# Patient Record
Sex: FEMALE | Race: WHITE | HISPANIC OR LATINO | Employment: UNEMPLOYED | URBAN - METROPOLITAN AREA
[De-identification: names, ages, dates, MRNs, and addresses within clinical notes are randomized per-mention and may not be internally consistent; named-entity substitution may affect disease eponyms.]

---

## 2020-06-02 ENCOUNTER — TELEPHONE (OUTPATIENT)
Dept: INTERNAL MEDICINE CLINIC | Facility: CLINIC | Age: 35
End: 2020-06-02

## 2020-06-03 ENCOUNTER — OFFICE VISIT (OUTPATIENT)
Dept: INTERNAL MEDICINE CLINIC | Facility: CLINIC | Age: 35
End: 2020-06-03

## 2020-06-03 ENCOUNTER — TELEPHONE (OUTPATIENT)
Dept: INTERNAL MEDICINE CLINIC | Facility: CLINIC | Age: 35
End: 2020-06-03

## 2020-06-03 VITALS
WEIGHT: 145.94 LBS | SYSTOLIC BLOOD PRESSURE: 210 MMHG | DIASTOLIC BLOOD PRESSURE: 118 MMHG | HEIGHT: 59 IN | TEMPERATURE: 98.2 F | HEART RATE: 86 BPM | BODY MASS INDEX: 29.42 KG/M2

## 2020-06-03 DIAGNOSIS — I16.0 HYPERTENSIVE URGENCY: Primary | ICD-10-CM

## 2020-06-03 DIAGNOSIS — E66.3 OVERWEIGHT WITH BODY MASS INDEX (BMI) OF 29 TO 29.9 IN ADULT: ICD-10-CM

## 2020-06-03 PROCEDURE — 99202 OFFICE O/P NEW SF 15 MIN: CPT | Performed by: PHYSICIAN ASSISTANT

## 2020-06-03 PROCEDURE — 1036F TOBACCO NON-USER: CPT | Performed by: PHYSICIAN ASSISTANT

## 2020-06-03 PROCEDURE — 3008F BODY MASS INDEX DOCD: CPT | Performed by: PHYSICIAN ASSISTANT

## 2020-06-03 RX ORDER — LOSARTAN POTASSIUM 25 MG/1
25 TABLET ORAL DAILY
COMMUNITY
End: 2020-06-03

## 2020-06-03 RX ORDER — AMLODIPINE BESYLATE 5 MG/1
5 TABLET ORAL DAILY
Qty: 30 TABLET | Refills: 5 | Status: SHIPPED | OUTPATIENT
Start: 2020-06-03 | End: 2020-06-19 | Stop reason: SDUPTHER

## 2020-06-03 RX ORDER — CHLORTHALIDONE 25 MG/1
25 TABLET ORAL DAILY
Qty: 30 TABLET | Refills: 5 | Status: SHIPPED | OUTPATIENT
Start: 2020-06-03

## 2020-06-08 ENCOUNTER — TELEPHONE (OUTPATIENT)
Dept: INTERNAL MEDICINE CLINIC | Facility: CLINIC | Age: 35
End: 2020-06-08

## 2020-06-10 ENCOUNTER — TELEPHONE (OUTPATIENT)
Dept: INTERNAL MEDICINE CLINIC | Facility: CLINIC | Age: 35
End: 2020-06-10

## 2020-06-19 ENCOUNTER — OFFICE VISIT (OUTPATIENT)
Dept: INTERNAL MEDICINE CLINIC | Facility: CLINIC | Age: 35
End: 2020-06-19

## 2020-06-19 VITALS
BODY MASS INDEX: 28.91 KG/M2 | HEIGHT: 60 IN | WEIGHT: 147.27 LBS | HEART RATE: 80 BPM | TEMPERATURE: 97.9 F | DIASTOLIC BLOOD PRESSURE: 88 MMHG | SYSTOLIC BLOOD PRESSURE: 142 MMHG

## 2020-06-19 DIAGNOSIS — I10 ESSENTIAL HYPERTENSION: ICD-10-CM

## 2020-06-19 PROCEDURE — 3079F DIAST BP 80-89 MM HG: CPT | Performed by: PHYSICIAN ASSISTANT

## 2020-06-19 PROCEDURE — 3077F SYST BP >= 140 MM HG: CPT | Performed by: PHYSICIAN ASSISTANT

## 2020-06-19 PROCEDURE — 99213 OFFICE O/P EST LOW 20 MIN: CPT | Performed by: PHYSICIAN ASSISTANT

## 2020-06-19 PROCEDURE — 3008F BODY MASS INDEX DOCD: CPT | Performed by: PHYSICIAN ASSISTANT

## 2020-06-19 PROCEDURE — 1036F TOBACCO NON-USER: CPT | Performed by: PHYSICIAN ASSISTANT

## 2020-06-19 RX ORDER — AMLODIPINE BESYLATE 10 MG/1
10 TABLET ORAL DAILY
Qty: 30 TABLET | Refills: 5 | Status: SHIPPED | OUTPATIENT
Start: 2020-06-19

## 2020-09-02 ENCOUNTER — APPOINTMENT (OUTPATIENT)
Dept: LAB | Facility: HOSPITAL | Age: 35
End: 2020-09-02

## 2020-09-02 ENCOUNTER — OFFICE VISIT (OUTPATIENT)
Dept: INTERNAL MEDICINE CLINIC | Facility: CLINIC | Age: 35
End: 2020-09-02

## 2020-09-02 VITALS
TEMPERATURE: 97.5 F | HEIGHT: 59 IN | BODY MASS INDEX: 30.58 KG/M2 | OXYGEN SATURATION: 100 % | DIASTOLIC BLOOD PRESSURE: 82 MMHG | HEART RATE: 82 BPM | SYSTOLIC BLOOD PRESSURE: 138 MMHG | WEIGHT: 151.68 LBS

## 2020-09-02 DIAGNOSIS — R20.0 NUMBNESS OF FINGERS: ICD-10-CM

## 2020-09-02 DIAGNOSIS — I10 ESSENTIAL HYPERTENSION: Primary | ICD-10-CM

## 2020-09-02 DIAGNOSIS — M54.2 NECK PAIN: ICD-10-CM

## 2020-09-02 DIAGNOSIS — M79.641 RIGHT HAND PAIN: ICD-10-CM

## 2020-09-02 DIAGNOSIS — I16.0 HYPERTENSIVE URGENCY: ICD-10-CM

## 2020-09-02 DIAGNOSIS — M79.661 RIGHT CALF PAIN: ICD-10-CM

## 2020-09-02 DIAGNOSIS — E66.3 OVERWEIGHT WITH BODY MASS INDEX (BMI) OF 29 TO 29.9 IN ADULT: ICD-10-CM

## 2020-09-02 LAB
ALBUMIN SERPL BCP-MCNC: 3.9 G/DL (ref 3.5–5)
ALP SERPL-CCNC: 79 U/L (ref 46–116)
ALT SERPL W P-5'-P-CCNC: 32 U/L (ref 12–78)
ANION GAP SERPL CALCULATED.3IONS-SCNC: 8 MMOL/L (ref 4–13)
AST SERPL W P-5'-P-CCNC: 20 U/L (ref 5–45)
BILIRUB SERPL-MCNC: 0.3 MG/DL (ref 0.2–1)
BUN SERPL-MCNC: 9 MG/DL (ref 5–25)
CALCIUM SERPL-MCNC: 7.8 MG/DL (ref 8.3–10.1)
CHLORIDE SERPL-SCNC: 104 MMOL/L (ref 100–108)
CHOLEST SERPL-MCNC: 148 MG/DL (ref 50–200)
CO2 SERPL-SCNC: 27 MMOL/L (ref 21–32)
CREAT SERPL-MCNC: 0.6 MG/DL (ref 0.6–1.3)
GFR SERPL CREATININE-BSD FRML MDRD: 118 ML/MIN/1.73SQ M
GLUCOSE SERPL-MCNC: 99 MG/DL (ref 65–140)
HDLC SERPL-MCNC: 34 MG/DL
LDLC SERPL CALC-MCNC: 95 MG/DL (ref 0–100)
NONHDLC SERPL-MCNC: 114 MG/DL
POTASSIUM SERPL-SCNC: 3.4 MMOL/L (ref 3.5–5.3)
PROT SERPL-MCNC: 8.1 G/DL (ref 6.4–8.2)
SODIUM SERPL-SCNC: 139 MMOL/L (ref 136–145)
TRIGL SERPL-MCNC: 93 MG/DL

## 2020-09-02 PROCEDURE — 80061 LIPID PANEL: CPT

## 2020-09-02 PROCEDURE — 36415 COLL VENOUS BLD VENIPUNCTURE: CPT

## 2020-09-02 PROCEDURE — 80053 COMPREHEN METABOLIC PANEL: CPT

## 2020-09-02 PROCEDURE — 99214 OFFICE O/P EST MOD 30 MIN: CPT | Performed by: PHYSICIAN ASSISTANT

## 2020-09-02 RX ORDER — MELOXICAM 15 MG/1
15 TABLET ORAL DAILY
Qty: 10 TABLET | Refills: 0 | Status: SHIPPED | OUTPATIENT
Start: 2020-09-02 | End: 2020-09-12

## 2020-09-02 RX ORDER — CYCLOBENZAPRINE HCL 10 MG
10 TABLET ORAL
Qty: 10 TABLET | Refills: 0 | Status: SHIPPED | OUTPATIENT
Start: 2020-09-02 | End: 2020-09-12

## 2020-09-02 NOTE — PROGRESS NOTES
Assessment/Plan:      Diagnoses and all orders for this visit:    Essential hypertension    Right calf pain  -     meloxicam (MOBIC) 15 mg tablet; Take 1 tablet (15 mg total) by mouth daily for 10 days Con comida  -     cyclobenzaprine (FLEXERIL) 10 mg tablet; Take 1 tablet (10 mg total) by mouth daily at bedtime for 10 days    Right hand pain  -     meloxicam (MOBIC) 15 mg tablet; Take 1 tablet (15 mg total) by mouth daily for 10 days Con comida  -     cyclobenzaprine (FLEXERIL) 10 mg tablet; Take 1 tablet (10 mg total) by mouth daily at bedtime for 10 days    Numbness of fingers  -     meloxicam (MOBIC) 15 mg tablet; Take 1 tablet (15 mg total) by mouth daily for 10 days Con comida  -     cyclobenzaprine (FLEXERIL) 10 mg tablet; Take 1 tablet (10 mg total) by mouth daily at bedtime for 10 days    Neck pain  -     meloxicam (MOBIC) 15 mg tablet; Take 1 tablet (15 mg total) by mouth daily for 10 days Con comida  -     cyclobenzaprine (FLEXERIL) 10 mg tablet; Take 1 tablet (10 mg total) by mouth daily at bedtime for 10 days      12-year-old female presenting today to follow-up for her blood pressure  She initially reported 2 weeks of headaches but then admitted that she was off her blood pressure medication for those 2 weeks as she did not realize she had refills  However she is back on the medication for 3 days and headaches have resolved  Her blood pressure today is 138/82 and mildly elevated  She reports complete compliance on amlodipine 10 mg and chlorthalidone 25 mg  Will continue to monitor for now and I did advise she consider checking her blood pressures at home  BLOOD WORK RESULTS REVIEWED WITH PATIENT TODAY TOTAL CHOLESTEROL 148, TRIGLYCERIDES 93, HDL 34, LDL 95  Fasting sugar 99, kidney and liver function normal   Calcium level slightly low at 7 8, potassium slightly low at 3 4  Patient has multiple complaints today regarding multiple pain and tingling complaints    She reports some discomfort in her right lower lateral calf that feels like cramping only at night  I reviewed her blood work results and her potassium was slightly low at 3 4  I do question possible side effect of the chlorthalidone being a diuretic as well  For now I have advised she increase her water intake to at least 60 80 oz a day  I advised that she eat 1 or 2 bananas a day and see if this improves  If it does not then I might need to consider discontinuation of the chlorthalidone  There is no evidence of DVT  I am unable to reproduce any symptoms at this time and she has no symptoms at present  Patient also expresses concern of tingling in her fingers on her right hand at night  However after further questioning as well as throughout the exam she continues adding more and more issues including to admitting some right neck discomfort at times  She also reports some hand pain in general that happens during the day as well as some wrist pain that seems to happen at night with the tingling  The exact etiology is unclear  It is unclear if the neck pain is even a factor with this and if she may have some more distal nerve related problems such as carpal tunnel  Certainly if she does have neck discomfort that she admits to I need to rule out pinched nerve as well  I will trial her on meloxicam and cyclobenzaprine with side effects discussed  She will take this for 2 weeks  I advised that she wear a right wrist brace that she can get from the local pharmacy at night to prevent any compression on the median nerve  She has no medical insurance so I will hold off on cervical spine x-ray but can consider this in addition to referral to orthopedic hand specialist depending on her response to recommended treatment  We will plan on having patient back in 3 weeks to reassess her symptoms     Will need to address health maintenance items in more detail as that was the intent today however much time was spent trying to understand patient's symptoms and come up with plan, with interpretation help from the Ayse Abernathy  which also took time  Chief Complaint   Patient presents with    Follow-up     headache, hands numb       Subjective:     Patient ID: Ryan Vail is a 37 y o  female     35y/o female here today for multiple complaints, as well as to review her BW results and recheck BP  CYRASPARQCode TELEPHONE  ISED FOR TODAYS VISIT  MA documented headaches for 2 weeks per pt  States she as experienced similar H/A's with high BP  She is very confusing when responding to questions with , as she is not responding directly to questions, need repetition of questions  After much questioning she relates that she was not taking blood pressure medication for a few weeks because she didn't realize she had refills, and during that time she was having headaches  States headaches are resolved since restarting the BP medication 3 days ago  Also reports right lower leg pain in lateral calf region below knee  Notes pain x 2 weeks  She does not recall injury  She states the pain is intermittent  States the pain is mainly at night when she is sleeping  She notes the muscle feels cramping or squeezing  She denies sxs during the day  She also reports N/T in fingers of right hand x 2 weeks  She reports the sxs are also at night  She does get pain in the wrist on right side when she gets the tingling  She is right handed  She also notes pain in the hand and fingers during the day  She does admit to some neck discomfort at times on right side  She reports compliance of amlodipine and chlorthalidone daily now  BP slightly elevated today at 138/82 but much improved since initial visit in June  She has not been checking her blood pressures at home  Lab results reviewed with patient today  Review of Systems   Constitutional: Negative  Respiratory: Negative      Cardiovascular: Negative  Gastrointestinal: Negative  Genitourinary: Negative  Musculoskeletal: Negative  As in HPI   Skin: Negative  Neurological:        As in HPI   Psychiatric/Behavioral: Negative  The following portions of the patient's history were reviewed and updated as appropriate: allergies, current medications, past family history, past medical history, past social history, past surgical history and problem list       Objective:     Physical Exam  Vitals signs reviewed  Constitutional:       General: She is not in acute distress  Appearance: She is obese  She is not ill-appearing  Neck:      Musculoskeletal: Normal range of motion and neck supple  Pain with movement and muscular tenderness (right) present  Vascular: Normal carotid pulses  No carotid bruit  Cardiovascular:      Rate and Rhythm: Normal rate and regular rhythm  Pulses: Normal pulses  Heart sounds: Normal heart sounds  Comments: Right lower leg below knee appearing normal without redness, swelling, ecchymosis or rash  I cannot currently reproduce any tenderness along the right lateral calf and there is no palpable swelling or masses  Pulmonary:      Effort: Pulmonary effort is normal       Breath sounds: Normal breath sounds  Musculoskeletal:      Right lower leg: No edema  Left lower leg: No edema  Comments: Neck findings as above  Patient has some mild tenderness to palpation of the generalized hand and fingers without any significant swelling, deformity or bony projection  She has normal  strength that is symmetrical bilaterally  Normal strength in her upper extremities that is symmetric  She has normal range of motion of her upper extremities at the shoulder joint  When I ask if she gets any pain with range of motion testing of her arms she pointed to her right hand    Interestingly enough patient did not reveal to me that she had any hand pain until this testing, only N/T at night  However we were not perform any range of motion specifically on her wrist, hand or fingers  it was testing range of motion in her upper extremities at the shoulder joint so I am unsure as to why she would have hand pain with moving her arms at her shoulders  Lymphadenopathy:      Head:      Right side of head: No submandibular or tonsillar adenopathy  Left side of head: No submandibular or tonsillar adenopathy  Skin:     Findings: No signs of injury, lesion or rash  Neurological:      Mental Status: She is alert and oriented to person, place, and time  Psychiatric:         Attention and Perception: Attention normal          Mood and Affect: Mood normal          Speech: Speech normal       Comments: Pt shy and reserved, unfortunately not directly answering questions during the visit, often needing to ask question again  This made it difficult to understand nature of her sxs           Vitals:    09/02/20 1549   BP: 138/82   BP Location: Right arm   Patient Position: Sitting   Cuff Size: Adult   Pulse: 82   Temp: 97 5 °F (36 4 °C)   TempSrc: Temporal   SpO2: 100%   Weight: 68 8 kg (151 lb 10 8 oz)   Height: 4' 11" (1 499 m)

## 2020-10-21 ENCOUNTER — TELEPHONE (OUTPATIENT)
Dept: INTERNAL MEDICINE CLINIC | Facility: CLINIC | Age: 35
End: 2020-10-21

## 2024-01-23 ENCOUNTER — HOSPITAL ENCOUNTER (INPATIENT)
Facility: HOSPITAL | Age: 39
LOS: 2 days | Discharge: HOME | DRG: 663 | End: 2024-01-25
Attending: EMERGENCY MEDICINE | Admitting: INTERNAL MEDICINE
Payer: COMMERCIAL

## 2024-01-23 ENCOUNTER — APPOINTMENT (OUTPATIENT)
Dept: RADIOLOGY | Facility: HOSPITAL | Age: 39
DRG: 663 | End: 2024-01-23
Payer: COMMERCIAL

## 2024-01-23 ENCOUNTER — APPOINTMENT (EMERGENCY)
Dept: CT IMAGING | Facility: HOSPITAL | Age: 39
DRG: 663 | End: 2024-01-23
Payer: COMMERCIAL

## 2024-01-23 DIAGNOSIS — D64.9 SYMPTOMATIC ANEMIA: ICD-10-CM

## 2024-01-23 DIAGNOSIS — R19.5 DARK STOOLS: ICD-10-CM

## 2024-01-23 DIAGNOSIS — I10 PRIMARY HYPERTENSION: ICD-10-CM

## 2024-01-23 DIAGNOSIS — D50.0 IRON DEFICIENCY ANEMIA DUE TO CHRONIC BLOOD LOSS: ICD-10-CM

## 2024-01-23 DIAGNOSIS — D64.9 ANEMIA: ICD-10-CM

## 2024-01-23 DIAGNOSIS — K92.2 UPPER GI BLEED: Primary | ICD-10-CM

## 2024-01-23 DIAGNOSIS — R55 SYNCOPE: ICD-10-CM

## 2024-01-23 DIAGNOSIS — R93.3 ABNORMAL CT SCAN, COLON: ICD-10-CM

## 2024-01-23 LAB
2HR DELTA HS TROPONIN: 0 NG/L
ABO GROUP BLD: NORMAL
ABO GROUP BLD: NORMAL
ALBUMIN SERPL BCP-MCNC: 4.3 G/DL (ref 3.5–5)
ALP SERPL-CCNC: 60 U/L (ref 34–104)
ALT SERPL W P-5'-P-CCNC: 13 U/L (ref 7–52)
ANION GAP SERPL CALCULATED.3IONS-SCNC: 5 MMOL/L
APTT PPP: 29 SECONDS (ref 23–37)
AST SERPL W P-5'-P-CCNC: 17 U/L (ref 13–39)
ATRIAL RATE: 81 BPM
BACTERIA UR QL AUTO: NORMAL /HPF
BASOPHILS # BLD AUTO: 0.03 THOUSANDS/ÂΜL (ref 0–0.1)
BASOPHILS NFR BLD AUTO: 1 % (ref 0–1)
BILIRUB SERPL-MCNC: 0.26 MG/DL (ref 0.2–1)
BILIRUB UR QL STRIP: NEGATIVE
BLD GP AB SCN SERPL QL: NEGATIVE
BUN SERPL-MCNC: 9 MG/DL (ref 5–25)
CALCIUM SERPL-MCNC: 8.7 MG/DL (ref 8.4–10.2)
CARDIAC TROPONIN I PNL SERPL HS: 5 NG/L
CARDIAC TROPONIN I PNL SERPL HS: 5 NG/L
CHLORIDE SERPL-SCNC: 104 MMOL/L (ref 96–108)
CLARITY UR: CLEAR
CO2 SERPL-SCNC: 29 MMOL/L (ref 21–32)
COLOR UR: COLORLESS
CREAT SERPL-MCNC: 0.55 MG/DL (ref 0.6–1.3)
EOSINOPHIL # BLD AUTO: 0.08 THOUSAND/ÂΜL (ref 0–0.61)
EOSINOPHIL NFR BLD AUTO: 1 % (ref 0–6)
ERYTHROCYTE [DISTWIDTH] IN BLOOD BY AUTOMATED COUNT: 21.5 % (ref 11.6–15.1)
EXT PREGNANCY TEST URINE: NEGATIVE
EXT. CONTROL: NORMAL
FERRITIN SERPL-MCNC: 1 NG/ML (ref 11–307)
FOLATE SERPL-MCNC: 16 NG/ML
GFR SERPL CREATININE-BSD FRML MDRD: 117 ML/MIN/1.73SQ M
GLUCOSE SERPL-MCNC: 94 MG/DL (ref 65–140)
GLUCOSE UR STRIP-MCNC: NEGATIVE MG/DL
HCT VFR BLD AUTO: 25.7 % (ref 34.8–46.1)
HGB BLD-MCNC: 6.7 G/DL (ref 11.5–15.4)
HGB UR QL STRIP.AUTO: ABNORMAL
IMM GRANULOCYTES # BLD AUTO: 0.02 THOUSAND/UL (ref 0–0.2)
IMM GRANULOCYTES NFR BLD AUTO: 0 % (ref 0–2)
INR PPP: 1.01 (ref 0.84–1.19)
KETONES UR STRIP-MCNC: NEGATIVE MG/DL
LEUKOCYTE ESTERASE UR QL STRIP: NEGATIVE
LIPASE SERPL-CCNC: 74 U/L (ref 11–82)
LYMPHOCYTES # BLD AUTO: 1.96 THOUSANDS/ÂΜL (ref 0.6–4.47)
LYMPHOCYTES NFR BLD AUTO: 34 % (ref 14–44)
MCH RBC QN AUTO: 14.7 PG (ref 26.8–34.3)
MCHC RBC AUTO-ENTMCNC: 26.1 G/DL (ref 31.4–37.4)
MCV RBC AUTO: 56 FL (ref 82–98)
MONOCYTES # BLD AUTO: 0.56 THOUSAND/ÂΜL (ref 0.17–1.22)
MONOCYTES NFR BLD AUTO: 10 % (ref 4–12)
NEUTROPHILS # BLD AUTO: 3.2 THOUSANDS/ÂΜL (ref 1.85–7.62)
NEUTS SEG NFR BLD AUTO: 54 % (ref 43–75)
NITRITE UR QL STRIP: NEGATIVE
NON-SQ EPI CELLS URNS QL MICRO: NORMAL /HPF
NRBC BLD AUTO-RTO: 0 /100 WBCS
P AXIS: 57 DEGREES
PH UR STRIP.AUTO: 7.5 [PH]
PLATELET # BLD AUTO: 447 THOUSANDS/UL (ref 149–390)
POTASSIUM SERPL-SCNC: 3.4 MMOL/L (ref 3.5–5.3)
PR INTERVAL: 164 MS
PROT SERPL-MCNC: 7.9 G/DL (ref 6.4–8.4)
PROT UR STRIP-MCNC: NEGATIVE MG/DL
PROTHROMBIN TIME: 13.7 SECONDS (ref 11.6–14.5)
QRS AXIS: 58 DEGREES
QRSD INTERVAL: 86 MS
QT INTERVAL: 388 MS
QTC INTERVAL: 450 MS
RBC # BLD AUTO: 4.56 MILLION/UL (ref 3.81–5.12)
RBC #/AREA URNS AUTO: NORMAL /HPF
RH BLD: POSITIVE
RH BLD: POSITIVE
SODIUM SERPL-SCNC: 138 MMOL/L (ref 135–147)
SP GR UR STRIP.AUTO: 1.01 (ref 1–1.03)
SPECIMEN EXPIRATION DATE: NORMAL
T WAVE AXIS: 256 DEGREES
UROBILINOGEN UR STRIP-ACNC: <2 MG/DL
VENTRICULAR RATE: 81 BPM
VIT B12 SERPL-MCNC: 325 PG/ML (ref 180–914)
WBC # BLD AUTO: 5.85 THOUSAND/UL (ref 4.31–10.16)
WBC #/AREA URNS AUTO: NORMAL /HPF

## 2024-01-23 PROCEDURE — 82728 ASSAY OF FERRITIN: CPT | Performed by: INTERNAL MEDICINE

## 2024-01-23 PROCEDURE — 83690 ASSAY OF LIPASE: CPT

## 2024-01-23 PROCEDURE — 83550 IRON BINDING TEST: CPT | Performed by: INTERNAL MEDICINE

## 2024-01-23 PROCEDURE — 93005 ELECTROCARDIOGRAM TRACING: CPT

## 2024-01-23 PROCEDURE — 99285 EMERGENCY DEPT VISIT HI MDM: CPT

## 2024-01-23 PROCEDURE — 84484 ASSAY OF TROPONIN QUANT: CPT | Performed by: EMERGENCY MEDICINE

## 2024-01-23 PROCEDURE — 81025 URINE PREGNANCY TEST: CPT

## 2024-01-23 PROCEDURE — 82607 VITAMIN B-12: CPT | Performed by: INTERNAL MEDICINE

## 2024-01-23 PROCEDURE — C9113 INJ PANTOPRAZOLE SODIUM, VIA: HCPCS | Performed by: INTERNAL MEDICINE

## 2024-01-23 PROCEDURE — G1004 CDSM NDSC: HCPCS

## 2024-01-23 PROCEDURE — 86850 RBC ANTIBODY SCREEN: CPT

## 2024-01-23 PROCEDURE — P9016 RBC LEUKOCYTES REDUCED: HCPCS

## 2024-01-23 PROCEDURE — 36430 TRANSFUSION BLD/BLD COMPNT: CPT

## 2024-01-23 PROCEDURE — 86901 BLOOD TYPING SEROLOGIC RH(D): CPT

## 2024-01-23 PROCEDURE — 86920 COMPATIBILITY TEST SPIN: CPT

## 2024-01-23 PROCEDURE — 99284 EMERGENCY DEPT VISIT MOD MDM: CPT

## 2024-01-23 PROCEDURE — 85610 PROTHROMBIN TIME: CPT

## 2024-01-23 PROCEDURE — 80053 COMPREHEN METABOLIC PANEL: CPT | Performed by: EMERGENCY MEDICINE

## 2024-01-23 PROCEDURE — 70450 CT HEAD/BRAIN W/O DYE: CPT

## 2024-01-23 PROCEDURE — 83540 ASSAY OF IRON: CPT | Performed by: INTERNAL MEDICINE

## 2024-01-23 PROCEDURE — 71046 X-RAY EXAM CHEST 2 VIEWS: CPT

## 2024-01-23 PROCEDURE — 36415 COLL VENOUS BLD VENIPUNCTURE: CPT

## 2024-01-23 PROCEDURE — 30233N1 TRANSFUSION OF NONAUTOLOGOUS RED BLOOD CELLS INTO PERIPHERAL VEIN, PERCUTANEOUS APPROACH: ICD-10-PCS | Performed by: EMERGENCY MEDICINE

## 2024-01-23 PROCEDURE — 96360 HYDRATION IV INFUSION INIT: CPT

## 2024-01-23 PROCEDURE — 74177 CT ABD & PELVIS W/CONTRAST: CPT

## 2024-01-23 PROCEDURE — 85025 COMPLETE CBC W/AUTO DIFF WBC: CPT | Performed by: EMERGENCY MEDICINE

## 2024-01-23 PROCEDURE — 85730 THROMBOPLASTIN TIME PARTIAL: CPT

## 2024-01-23 PROCEDURE — 99222 1ST HOSP IP/OBS MODERATE 55: CPT | Performed by: INTERNAL MEDICINE

## 2024-01-23 PROCEDURE — 86900 BLOOD TYPING SEROLOGIC ABO: CPT

## 2024-01-23 PROCEDURE — 82746 ASSAY OF FOLIC ACID SERUM: CPT | Performed by: INTERNAL MEDICINE

## 2024-01-23 PROCEDURE — 81001 URINALYSIS AUTO W/SCOPE: CPT

## 2024-01-23 RX ORDER — ACETAMINOPHEN 325 MG/1
650 TABLET ORAL EVERY 6 HOURS PRN
Status: DISCONTINUED | OUTPATIENT
Start: 2024-01-23 | End: 2024-01-25 | Stop reason: HOSPADM

## 2024-01-23 RX ORDER — ACETAMINOPHEN 325 MG/1
650 TABLET ORAL ONCE
Status: COMPLETED | OUTPATIENT
Start: 2024-01-23 | End: 2024-01-23

## 2024-01-23 RX ORDER — AMLODIPINE BESYLATE 5 MG/1
10 TABLET ORAL ONCE
Status: COMPLETED | OUTPATIENT
Start: 2024-01-23 | End: 2024-01-23

## 2024-01-23 RX ORDER — AMLODIPINE BESYLATE 5 MG/1
10 TABLET ORAL ONCE
Status: COMPLETED | OUTPATIENT
Start: 2024-01-24 | End: 2024-01-24

## 2024-01-23 RX ORDER — PANTOPRAZOLE SODIUM 40 MG/10ML
40 INJECTION, POWDER, LYOPHILIZED, FOR SOLUTION INTRAVENOUS EVERY 12 HOURS
Status: DISCONTINUED | OUTPATIENT
Start: 2024-01-23 | End: 2024-01-25 | Stop reason: HOSPADM

## 2024-01-23 RX ORDER — LABETALOL HYDROCHLORIDE 5 MG/ML
10 INJECTION, SOLUTION INTRAVENOUS 3 TIMES DAILY PRN
Status: DISCONTINUED | OUTPATIENT
Start: 2024-01-23 | End: 2024-01-25 | Stop reason: HOSPADM

## 2024-01-23 RX ORDER — CHLORTHALIDONE 25 MG/1
25 TABLET ORAL ONCE
Status: COMPLETED | OUTPATIENT
Start: 2024-01-23 | End: 2024-01-23

## 2024-01-23 RX ADMIN — IOHEXOL 100 ML: 350 INJECTION, SOLUTION INTRAVENOUS at 15:42

## 2024-01-23 RX ADMIN — AMLODIPINE BESYLATE 10 MG: 5 TABLET ORAL at 16:04

## 2024-01-23 RX ADMIN — ACETAMINOPHEN 325MG 650 MG: 325 TABLET ORAL at 15:11

## 2024-01-23 RX ADMIN — CHLORTHALIDONE 25 MG: 25 TABLET ORAL at 16:04

## 2024-01-23 RX ADMIN — SODIUM CHLORIDE 1000 ML: 0.9 INJECTION, SOLUTION INTRAVENOUS at 15:07

## 2024-01-23 RX ADMIN — PANTOPRAZOLE SODIUM 40 MG: 40 INJECTION, POWDER, FOR SOLUTION INTRAVENOUS at 17:21

## 2024-01-23 RX ADMIN — ACETAMINOPHEN 325MG 650 MG: 325 TABLET ORAL at 22:00

## 2024-01-23 NOTE — H&P
[facility]  H&P  Name: Esme Carl 38 y.o. female I MRN: [id number]  Unit/Bed#: MS 323Lilian I Date of Admission: 1/23/2024   Date of Service: 1/23/2024 I Hospital Day: 0      Assessment/Plan   Anemia  Assessment & Plan  Unknown baseline  Present on admission 6.7, 1 unit PRBC ordered in the ED  Low MCV  Check iron panel, B12, folate  Will transfuse iron  Monitor H&H      Dark stools  Assessment & Plan  Presenting with dark stools for almost a month  Has not been on NSAIDs  No Prior episodes  Never had EGD/Colonoscopy      Protonix 40 mg IV BID  H&H q.8 hours  Consult gastroenterology,  CLD until GI eval  Transfuse if Hb < 7, Type and screen  Monitor VS    Essential hypertension  Assessment & Plan  Continue Amlodipine, with hold parameters  Will hold chlorthalidone           VTE Prophylaxis: Pharmacologic VTE Prophylaxis contraindicated due to dark melanotic stool   / sequential compression device   Code Status: Level 1  POLST: POLST form is not discussed and not completed at this time.    Anticipated Length of Stay:  Patient will be admitted on an Inpatient basis with an anticipated length of stay of more than 2 midnights.   Justification for Hospital Stay: GI bleed    Chief Complaint:   Syncope    History of Present Illness:    Esme Carl is a 38 y.o. female past medical history of HTN who presents with syncope.  Patient stated she had lost consciousness at work today for about 5 minutes, no presyncopal symptoms, no tongue biting, urinary incontinence. NO chest pain, palpitations, feels more tired than usual for the past few weeks. Patient stated she had dark stools for the past month, denies use of NsAIDS. No change in Bms. Stated she has usually heavier menstural periods, lasting 5-6 days, changes about 4 pads/day. Denies weight loss.    Review of Systems:    Review of Systems   All other systems reviewed and are negative.      Past Medical and Surgical  History:     Past Medical History:   Diagnosis Date    Depression     Patient believes she suffers from depression due to going through a process with Immigration.    Hypertension        Past Surgical History:   Procedure Laterality Date     SECTION      as per patient total of 3 c-sections due to hypertension    TUBAL LIGATION      Patient believes she had a tubal ligation done.  Did not seem to certain       Meds/Allergies:    Prior to Admission medications    Medication Sig Start Date End Date Taking? Authorizing Provider   amLODIPine (NORVASC) 10 mg tablet Take 1 tablet (10 mg total) by mouth daily 20   Cindy Grant PA-C   chlorthalidone 25 mg tablet Take 1 tablet (25 mg total) by mouth daily 6/3/20   Cindy Grant PA-C   cyclobenzaprine (FLEXERIL) 10 mg tablet Take 1 tablet (10 mg total) by mouth daily at bedtime for 10 days 20  Cindy Grant PA-C   meloxicam (MOBIC) 15 mg tablet Take 1 tablet (15 mg total) by mouth daily for 10 days Con comida 20  Cindy Grant PA-C     I have reviewed home medications with patient personally.    Allergies:   Allergies   Allergen Reactions    Enalapril Cough     Scratchy throat       Social History:     Marital Status: Single   Occupation: cuts fruit  Patient Pre-hospital Living Situation: home  Patient Pre-hospital Level of Mobility: mobile  Patient Pre-hospital Diet Restrictions: none  Substance Use History:   Social History     Substance and Sexual Activity   Alcohol Use Never     Social History     Tobacco Use   Smoking Status Never   Smokeless Tobacco Never     Social History     Substance and Sexual Activity   Drug Use Never       Family History:    Family History   Problem Relation Age of Onset    Hypertension Mother     No Known Problems Father     No Known Problems Sister     No Known Problems Brother     No Known Problems Son     No Known Problems Daughter        Physical Exam:     Vitals:   Blood Pressure:  155/87 (01/23/24 1822)  Pulse: 76 (01/23/24 1822)  Temperature: 97.9 °F (36.6 °C) (01/23/24 1822)  Temp Source: Oral (01/23/24 1739)  Respirations: 18 (01/23/24 1822)  SpO2: 99 % (01/23/24 1822)    Physical Exam    Gen.-Patient comfortable, appears pale  Lungs-Clear bilaterally without any wheeze or rales   Heart S1-S2, regular rate and rhythm, no murmurs  Abdomen-soft LLQtender+, no organomegaly. Bowel sounds present  Extremities-no cyanosi,  clubbing or edema  Skin- no rash, pale  Pale conjunctiva  Neuro-nonfocal     Additional Data:     Lab Results: I have personally reviewed pertinent reports.      Results from last 7 days   Lab Units 01/23/24  1355   WBC Thousand/uL 5.85   HEMOGLOBIN g/dL 6.7*   HEMATOCRIT % 25.7*   PLATELETS Thousands/uL 447*   NEUTROS PCT % 54   LYMPHS PCT % 34   MONOS PCT % 10   EOS PCT % 1     Results from last 7 days   Lab Units 01/23/24  1355   POTASSIUM mmol/L 3.4*   CHLORIDE mmol/L 104   CO2 mmol/L 29   BUN mg/dL 9   CREATININE mg/dL 0.55*   CALCIUM mg/dL 8.7   ALK PHOS U/L 60   ALT U/L 13   AST U/L 17     Results from last 7 days   Lab Units 01/23/24  1355   INR  1.01       Imaging: I have personally reviewed pertinent reports.   and I have personally reviewed pertinent films in PACS    CT head without contrast    Result Date: 1/23/2024  Narrative: CT BRAIN - WITHOUT CONTRAST INDICATION:   Severe headaches worsening x1 month, +N, syncope. COMPARISON:  None. TECHNIQUE:  CT examination of the brain was performed.  Multiplanar 2D reformatted images were created from the source data. Radiation dose length product (DLP) for this visit:  891 mGy-cm .  This examination, like all CT scans performed in the [facility], was performed utilizing techniques to minimize radiation dose exposure, including the use of iterative reconstruction and automated exposure control. IMAGE QUALITY:  Diagnostic. FINDINGS: PARENCHYMA:  No intracranial mass, mass effect or midline shift. No CT signs  of acute infarction.  No acute parenchymal hemorrhage. VENTRICLES AND EXTRA-AXIAL SPACES:  Normal for the patient's age. VISUALIZED ORBITS: Normal visualized orbits. PARANASAL SINUSES: Mild polypoid mucosal thickening of the visualized maxillary sinuses. CALVARIUM AND EXTRACRANIAL SOFT TISSUES:  Normal.     Impression: No acute intracranial abnormality. Workstation performed: [id number]     CT abdomen pelvis with contrast    Result Date: 1/23/2024  Narrative: CT ABDOMEN AND PELVIS WITH IV CONTRAST INDICATION: Epigastric pain Epigastric abd pain, +N. COMPARISON: None. TECHNIQUE: CT examination of the abdomen and pelvis was performed. Multiplanar 2D reformatted images were created from the source data. This examination, like all CT scans performed in the [facility], was performed utilizing techniques to minimize radiation dose exposure, including the use of iterative reconstruction and automated exposure control. Radiation dose length product (DLP) for this visit: 475.72 mGy-cm IV Contrast: 100 mL of iohexol (OMNIPAQUE) Enteric Contrast: Not administered. FINDINGS: ABDOMEN LOWER CHEST: Small hiatal hernia. No other clinically significant abnormality in the visualized lower chest. LIVER/BILIARY TREE: Unremarkable. GALLBLADDER: No calcified gallstones. No pericholecystic inflammatory change. SPLEEN: Unremarkable. PANCREAS: Unremarkable. ADRENAL GLANDS: Unremarkable. KIDNEYS/URETERS: Unremarkable. No hydronephrosis. STOMACH AND BOWEL: Questionable ascending colonic wall thickening. Otherwise, within normal limits. APPENDIX: No findings to suggest appendicitis. ABDOMINOPELVIC CAVITY: No ascites. No pneumoperitoneum. No lymphadenopathy. VESSELS: Unremarkable atherosclerotic for patient's age. PELVIS REPRODUCTIVE ORGANS: Probable small uterine fibroids. URINARY BLADDER: Unremarkable. ABDOMINAL WALL/INGUINAL REGIONS: Unremarkable. BONES: No acute fracture or suspicious osseous lesion.     Impression:  Questionable ascending colonic wall thickening, which may be related to underdistention versus mild colitis in the appropriate clinical setting. Consider follow-up colonoscopy for further evaluation, given anemia Otherwise, no acute abnormality in the abdomen or pelvis. Workstation performed: [id number]     XR chest 2 views    Result Date: 1/23/2024  Narrative: CHEST INDICATION:   Chest Pain. COMPARISON:  None EXAM PERFORMED/VIEWS:  XR CHEST PA & LATERAL The frontal view was performed utilizing dual energy radiographic technique. Images: 4 FINDINGS: Cardiomediastinal silhouette appears unremarkable. The lungs are clear.  No pneumothorax or pleural effusion. Osseous structures appear within normal limits for patient age.     Impression: No acute cardiopulmonary disease. Workstation performed: [id number]       EKG, Pathology, and Other Studies Reviewed on Admission:   EKG: Normal sinus rhythm    Epic / Care Everywhere Records Reviewed: Yes     ** Please Note: This note has been constructed using a voice recognition system. **

## 2024-01-23 NOTE — ASSESSMENT & PLAN NOTE
Presenting with dark stools for almost a month  Has not been on NSAIDs  No Prior episodes  Never had EGD/Colonoscopy      Protonix 40 mg IV BID  H&H q.8 hours  Consult gastroenterology,  CLD until GI eval  Transfuse if Hb < 7, Type and screen  Monitor VS

## 2024-01-23 NOTE — LETTER
[facility] MED SURG [facility]  [address] [address]  Dept: [phone]    January 25, 2024     Patient: Esme Carl   YOB: 1985   Date of Visit: 1/23/2024       To Whom it May Concern:    Esme Carl is under my professional care. She was seen in the hospital from 1/23/2024 to 01/25/24. She may return to work on 1/29/2024 without limitations.    If you have any questions or concerns, please don't hesitate to call.         Sincerely,          Rosario Irizarry MD

## 2024-01-23 NOTE — ASSESSMENT & PLAN NOTE
Unknown baseline  Present on admission 6.7, 1 unit PRBC ordered in the ED  Low MCV  Check iron panel, B12, folate  Will transfuse iron  Monitor H&H

## 2024-01-23 NOTE — ED PROVIDER NOTES
History  Chief Complaint   Patient presents with    Syncope     Pt was standing, got dizzy and passed out. Pt denies striking head. Pt feels tired and has a headache.      The patient is a 38-year-old female with PMH of HTN presenting for evaluation of syncopal event.  The patient reports for the last week she has had lightheadedness and dizziness on exertion as well as increased frequency of headaches. 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  It was witnessed, without head strike, with loss of consciousness estimated under 1 minute.  Patient came to and was acting her normal self.  She called her friend who brings her into the ED for further evaluation.  The patient has not been taking her blood pressure medications, 10mg of amlodipine and 25 mg of chlorthalidone, for approximately 1 month due to insurance and refill issues. She endorses frequent headaches, approximately 2 to 3/week, that have been worsening over the last week.  She notes feeling slightly blurred vision in both eyes but denies double vision or loss of vision or black spots in vision.  She denies recent illnesses, fevers, chills, cough, rhinorrhea/nasal congestion.  She endorses episodes of chest tightness and shortness of breath x 1 month most notably on exertion.  She reports 1 week of epigastric abdominal pain that has been waxing and waning, with 1 day of nausea.  She denies vomiting or diarrhea.  On further questioning of bleeding, she does endorse 2 to 3 months of dark, melanotic stools.  She denies bright red blood per rectum.  She also notes slightly heavier than normal menses, however the 1 this past month was reportedly normal per patient.      History provided by:  Patient   used: No    Syncope  Associated symptoms: chest pain, dizziness, headaches, nausea, shortness of breath and weakness    Associated symptoms: no fever, no  palpitations, no seizures and no vomiting        Prior to Admission Medications   Prescriptions Last Dose Informant Patient Reported? Taking?   amLODIPine (NORVASC) 10 mg tablet   No No   Sig: Take 1 tablet (10 mg total) by mouth daily   chlorthalidone 25 mg tablet   No No   Sig: Take 1 tablet (25 mg total) by mouth daily   cyclobenzaprine (FLEXERIL) 10 mg tablet   No No   Sig: Take 1 tablet (10 mg total) by mouth daily at bedtime for 10 days   meloxicam (MOBIC) 15 mg tablet   No No   Sig: Take 1 tablet (15 mg total) by mouth daily for 10 days Con comida      Facility-Administered Medications: None       Past Medical History:   Diagnosis Date    Depression     Patient believes she suffers from depression due to going through a process with Immigration.    Hypertension        Past Surgical History:   Procedure Laterality Date     SECTION      as per patient total of 3 c-sections due to hypertension    TUBAL LIGATION      Patient believes she had a tubal ligation done.  Did not seem to certain       Family History   Problem Relation Age of Onset    Hypertension Mother     No Known Problems Father     No Known Problems Sister     No Known Problems Brother     No Known Problems Son     No Known Problems Daughter      I have reviewed and agree with the history as documented.    E-Cigarette/Vaping    E-Cigarette Use Never User      E-Cigarette/Vaping Substances    Nicotine No     THC No     CBD No     Flavoring No     Other No     Unknown No      Social History     Tobacco Use    Smoking status: Never    Smokeless tobacco: Never   Vaping Use    Vaping status: Never Used   Substance Use Topics    Alcohol use: Never    Drug use: Never       Review of Systems   Constitutional:  Negative for appetite change, chills and fever.   Eyes:  Negative for pain and visual disturbance.   Respiratory:  Positive for shortness of breath. Negative for cough.    Cardiovascular:  Positive for chest pain and syncope. Negative for  palpitations and leg swelling.   Gastrointestinal:  Positive for abdominal pain (Epigastric abdominal pain) and nausea. Negative for blood in stool (Denies bright red blood, endorses melanotic stools), constipation, diarrhea, rectal pain and vomiting.   Genitourinary: Negative.    Musculoskeletal:  Negative for back pain, gait problem and neck pain.   Skin:  Positive for pallor (Per friend). Negative for color change, rash and wound.   Neurological:  Positive for dizziness, syncope, weakness, light-headedness and headaches. Negative for tremors, seizures, facial asymmetry, speech difficulty and numbness.   All other systems reviewed and are negative.      Physical Exam  Physical Exam  Vitals and nursing note reviewed.   Constitutional:       General: She is awake. She is not in acute distress.     Appearance: Normal appearance. She is well-developed. She is not ill-appearing, toxic-appearing or diaphoretic.      Comments: Appears fatigued and mildly ill   HENT:      Head: Normocephalic and atraumatic. No contusion or laceration.      Jaw: There is normal jaw occlusion. No tenderness, swelling, pain on movement or malocclusion.      Right Ear: No hemotympanum.      Left Ear: No hemotympanum.      Nose: Nose normal.      Mouth/Throat:      Lips: Pink. No lesions.      Mouth: Mucous membranes are moist. Mucous membranes are pale.      Pharynx: Oropharynx is clear. Uvula midline.   Eyes:      General: Lids are normal. Vision grossly intact. Gaze aligned appropriately.      Extraocular Movements: Extraocular movements intact.      Conjunctiva/sclera: Conjunctivae normal.      Pupils: Pupils are equal, round, and reactive to light.   Neck:      Trachea: Phonation normal. No abnormal tracheal secretions.   Cardiovascular:      Rate and Rhythm: Normal rate and regular rhythm.      Pulses:           Radial pulses are 2+ on the right side and 2+ on the left side.        Dorsalis pedis pulses are 2+ on the right side and 2+ on  the left side.        Posterior tibial pulses are 2+ on the right side and 2+ on the left side.      Heart sounds: Normal heart sounds, S1 normal and S2 normal. No murmur heard.  Pulmonary:      Effort: Pulmonary effort is normal. No tachypnea or respiratory distress.      Breath sounds: Normal breath sounds and air entry. No stridor, decreased air movement or transmitted upper airway sounds. No decreased breath sounds.   Abdominal:      General: There is no distension.      Palpations: Abdomen is soft.      Tenderness: There is abdominal tenderness in the epigastric area. There is no right CVA tenderness, left CVA tenderness, guarding or rebound.   Musculoskeletal:         General: Normal range of motion.      Cervical back: Full passive range of motion without pain, normal range of motion and neck supple. No spinous process tenderness.      Right lower leg: No edema.      Left lower leg: No edema.      Comments: THAKUR, 5/5 strength throughout, sensation intact, no focal joint swelling. Ambulatory with steady gait.   Skin:     General: Skin is warm and dry.      Capillary Refill: Capillary refill takes less than 2 seconds.      Findings: No rash or wound.   Neurological:      General: No focal deficit present.      Mental Status: She is alert and oriented to person, place, and time. Mental status is at baseline.      Cranial Nerves: Cranial nerves 2-12 are intact.      Sensory: Sensation is intact.      Motor: Motor function is intact.      Gait: Gait is intact.   Psychiatric:         Behavior: Behavior is cooperative.         Vital Signs  ED Triage Vitals   Temperature Pulse Respirations Blood Pressure SpO2   01/23/24 1332 01/23/24 1332 01/23/24 1332 01/23/24 1332 01/23/24 1332   98.2 °F (36.8 °C) 79 18 (!) 166/106 100 %      Temp src Heart Rate Source Patient Position - Orthostatic VS BP Location FiO2 (%)   -- 01/23/24 1500 01/23/24 1500 01/23/24 1500 --    Monitor Lying Right arm       Pain Score       01/23/24  1511       Med Not Given for Pain - for MAR use only           Vitals:    01/23/24 1500 01/23/24 1512 01/23/24 1514 01/23/24 1604   BP: 158/83 (!) 179/106 (!) 218/109 (!) 175/89   Pulse: 71      Patient Position - Orthostatic VS: Lying Sitting Standing          Visual Acuity      ED Medications  Medications   pantoprazole (PROTONIX) injection 40 mg (has no administration in time range)   iron sucrose (VENOFER) 200 mg in sodium chloride 0.9% 100 ml IVPB 200 mg (has no administration in time range)   sodium chloride 0.9 % bolus 1,000 mL (0 mL Intravenous Stopped 1/23/24 1610)   acetaminophen (TYLENOL) tablet 650 mg (650 mg Oral Given 1/23/24 1511)   amLODIPine (NORVASC) tablet 10 mg (10 mg Oral Given 1/23/24 1604)   chlorthalidone tablet 25 mg (25 mg Oral Given 1/23/24 1604)   iohexol (OMNIPAQUE) 350 MG/ML injection (SINGLE-DOSE) 100 mL (100 mL Intravenous Given 1/23/24 1542)       Diagnostic Studies  Results Reviewed       Procedure Component Value Units Date/Time    Vitamin B12 [[id number]]     Lab Status: No result Specimen: Blood     Folate [[id number]]     Lab Status: No result Specimen: Blood     HS Troponin I 2hr [[id number]]  (Normal) Collected: 01/23/24 1601    Lab Status: Final result Specimen: Blood from Arm, Left Updated: 01/23/24 1630     hs TnI 2hr 5 ng/L      Delta 2hr hsTnI 0 ng/L     UA w Reflex to Microscopic w Reflex to Culture [[id number]]  (Abnormal) Collected: 01/23/24 1558    Lab Status: Final result Specimen: Urine, Clean Catch Updated: 01/23/24 1614     Color, UA Colorless     Clarity, UA Clear     Specific Gravity, UA 1.010     pH, UA 7.5     Leukocytes, UA Negative     Nitrite, UA Negative     Protein, UA Negative mg/dl      Glucose, UA Negative mg/dl      Ketones, UA Negative mg/dl      Urobilinogen, UA <2.0 mg/dl      Bilirubin, UA Negative     Occult Blood, UA Trace    Urine Microscopic [[id number]] Collected: 01/23/24 1558    Lab Status: In process Specimen: Urine, Clean Catch Updated:  01/23/24 1609    POCT pregnancy, urine [[id number]]  (Normal) Resulted: 01/23/24 1600    Lab Status: Final result Updated: 01/23/24 1600     EXT Preg Test, Ur Negative     Control Valid    Lipase [[id number]]  (Normal) Collected: 01/23/24 1355    Lab Status: Final result Specimen: Blood from Arm, Right Updated: 01/23/24 1535     Lipase 74 u/L     Protime-INR [[id number]]  (Normal) Collected: 01/23/24 1355    Lab Status: Final result Specimen: Blood from Arm, Right Updated: 01/23/24 1451     Protime 13.7 seconds      INR 1.01    APTT [[id number]]  (Normal) Collected: 01/23/24 1355    Lab Status: Final result Specimen: Blood from Arm, Right Updated: 01/23/24 1451     PTT 29 seconds     HS Troponin 0hr (reflex protocol) [[id number]]  (Normal) Collected: 01/23/24 1355    Lab Status: Final result Specimen: Blood from Arm, Right Updated: 01/23/24 1429     hs TnI 0hr 5 ng/L     Comprehensive metabolic panel [[id number]]  (Abnormal) Collected: 01/23/24 1355    Lab Status: Final result Specimen: Blood from Arm, Right Updated: 01/23/24 1425     Sodium 138 mmol/L      Potassium 3.4 mmol/L      Chloride 104 mmol/L      CO2 29 mmol/L      ANION GAP 5 mmol/L      BUN 9 mg/dL      Creatinine 0.55 mg/dL      Glucose 94 mg/dL      Calcium 8.7 mg/dL      AST 17 U/L      ALT 13 U/L      Alkaline Phosphatase 60 U/L      Total Protein 7.9 g/dL      Albumin 4.3 g/dL      Total Bilirubin 0.26 mg/dL      eGFR 117 ml/min/1.73sq m     Narrative:      [employer] guidelines for Chronic Kidney Disease (CKD):     Stage 1 with normal or high GFR (GFR > 90 mL/min/1.73 square meters)    Stage 2 Mild CKD (GFR = 60-89 mL/min/1.73 square meters)    Stage 3A Moderate CKD (GFR = 45-59 mL/min/1.73 square meters)    Stage 3B Moderate CKD (GFR = 30-44 mL/min/1.73 square meters)    Stage 4 Severe CKD (GFR = 15-29 mL/min/1.73 square meters)    Stage 5 End Stage CKD (GFR <15 mL/min/1.73 square meters)  Note: GFR calculation is accurate only  with a steady state creatinine    CBC and differential [[id number]]  (Abnormal) Collected: 01/23/24 1355    Lab Status: Final result Specimen: Blood from Arm, Right Updated: 01/23/24 1413     WBC 5.85 Thousand/uL      RBC 4.56 Million/uL      Hemoglobin 6.7 g/dL      Hematocrit 25.7 %      MCV 56 fL      MCH 14.7 pg      MCHC 26.1 g/dL      RDW 21.5 %      Platelets 447 Thousands/uL      nRBC 0 /100 WBCs      Neutrophils Relative 54 %      Immat GRANS % 0 %      Lymphocytes Relative 34 %      Monocytes Relative 10 %      Eosinophils Relative 1 %      Basophils Relative 1 %      Neutrophils Absolute 3.20 Thousands/µL      Immature Grans Absolute 0.02 Thousand/uL      Lymphocytes Absolute 1.96 Thousands/µL      Monocytes Absolute 0.56 Thousand/µL      Eosinophils Absolute 0.08 Thousand/µL      Basophils Absolute 0.03 Thousands/µL     Narrative:      This is an appended report.  These results have been appended to a previously verified report.                   CT head without contrast   Final Result by Garfield Austin MD (01/23 1604)      No acute intracranial abnormality.                  Workstation performed: [id number]         CT abdomen pelvis with contrast   Final Result by Catalino Resendez MD (01/23 1603)      Questionable ascending colonic wall thickening, which may be related to underdistention versus mild colitis in the appropriate clinical setting. Consider follow-up colonoscopy for further evaluation, given anemia      Otherwise, no acute abnormality in the abdomen or pelvis.         Workstation performed: [id number]         XR chest 2 views   ED Interpretation by CELESTINA Staton (01/23 1424)   No acute cardiopulmonary disease identified by me.      Final Result by Chad Medrano MD (01/23 1443)      No acute cardiopulmonary disease.                  Workstation performed: [id number]                    Procedures  ECG 12 Lead Documentation Only    Date/Time: 1/23/2024 2:05 PM    Performed by: Vikki Fermin  CELESTINA  Authorized by: CELESTINA Staton    Indications / Diagnosis:  Syncope  ECG reviewed by me, the ED Provider: yes    Patient location:  ED  Previous ECG:     Previous ECG:  Unavailable    Comparison to cardiac monitor: Yes    Interpretation:     Interpretation: non-specific    Rate:     ECG rate:  81    ECG rate assessment: normal    Rhythm:     Rhythm: sinus rhythm    Ectopy:     Ectopy: none    QRS:     QRS axis:  Normal    QRS intervals:  Normal  Conduction:     Conduction: normal    ST segments:     ST segments:  Normal  T waves:     T waves: inverted      Inverted:  II, III, aVF, V5 and V6  Comments:      Sinus rhythm, normal axis, normal intervals, T wave inversion in inferior leads, T wave inversion of V5 and V6, no acute ischemic changes read by me           ED Course  ED Course as of 01/23/24 1716   Tue Jan 23, 2024   1403 Blood Pressure(!): 166/106  Noted elevated BP at triage, per med hx review pt on 10 amlodipine and 25 chlorthalidone, will trend and determine if pt taking meds   1404 First nurse orders placed including CMP, CBC, troponin, EKG, two-view chest x-ray   1423 Hemoglobin(!!): 6.7  Noted critical low hemoglobin, no prior data to compare to, will consent patient for blood transfusion   1423 CBC and differential(!!)  Microcytic hypochromic anemia   1423 WBC: 5.85  No leukocytosis   1426 Potassium(!): 3.4  Mild hypokalemia   1428 Creatinine(!): 0.55  No JUNG   1429 hs TnI 0hr: 5  Will delta   1447 XR chest 2 views  IMPRESSION:     No acute cardiopulmonary disease.     1511 Coags stable   1550 Lipase: 74  WNL   1600 PREGNANCY TEST URINE: Negative  Noted negative   1605 CT abdomen pelvis with contrast  IMPRESSION:     Questionable ascending colonic wall thickening, which may be related to underdistention versus mild colitis in the appropriate clinical setting. Consider follow-up colonoscopy for further evaluation, given anemia     Otherwise, no acute abnormality in the abdomen or pelvis.      1615 UA w Reflex to Microscopic w Reflex to Culture(!)  No evidence of infection, will await urine micro   1615 CT head without contrast  IMPRESSION:     No acute intracranial abnormality.        1631 hs TnI 2hr: 5  Negative delta trop, doubt ACS, CP/chest tightness likely secondary to anemia   1631 Patient and her friend at bedside updated on results and plan for admission.  She is in agreement at this time.  She notes her headache is improved.  Blood pressure downtrending.  Suspect upper GI bleed as source of blood loss and symptomatic anemia.  Given Protonix bolus and Protonix infusion, no active bright red blood bleeding from rectum.  No vaginal bleeding currently.  Patient hemodynamically stable.  Stable for admission to [facility]                                             Medical Decision Making  Differential diagnosis including but not limited to: Anemia, vasovagal syncope, cardiac arrhythmia, dehydration, electrolyte derangement    Patient appears fatigued, pale, with pale mucous membranes.  Notably hypertensive to 160s to 190s over 100s.  Has been off her antihypertensive medications.  Physical exam with nonfocal neurologic exam, no midline cervical spine tenderness.  Obtaining CT head for frequency and acute worsening of headaches with syncopal event and possible head strike.  Grants C-spine were used and patient does not need cervical spine imaging.  First nurse orders placed including blood work with note of anemia of 6.7.  Suspect chronic losses through GI tract and possible heavy menstrual bleeding.  No active menstrual bleeding currently.  Patient defers digital rectal examination.  Patient consented for blood products and 1 unit ordered.  Given focal epigastric tenderness, will obtain CT imaging.  Doubt alcoholic gastritis given very infrequent use of alcohol.  Patient denies other recreational drugs.  Patient denies personal history of cancer.  Will plan for admission for further evaluation with  GI for concern for upper GI bleed.    See ED course for further MDM and disposition discussion.        Problems Addressed:  Primary hypertension: chronic illness or injury  Symptomatic anemia: acute illness or injury  Syncope: acute illness or injury  Upper GI bleed: acute illness or injury    Amount and/or Complexity of Data Reviewed  Labs: ordered. Decision-making details documented in ED Course.  Radiology: ordered and independent interpretation performed. Decision-making details documented in ED Course.  ECG/medicine tests: ordered and independent interpretation performed.    Risk  OTC drugs.  Prescription drug management.  Decision regarding hospitalization.             Disposition  Final diagnoses:   Upper GI bleed   Symptomatic anemia   Syncope   Primary hypertension     Time reflects when diagnosis was documented in both MDM as applicable and the Disposition within this note       Time User Action Codes Description Comment    1/23/2024  4:32 PM Vikki Fermin [K92.2] Upper GI bleed     1/23/2024  4:32 PM Vikki Fermin [D64.9] Symptomatic anemia     1/23/2024  4:32 PM Vikki Fermin [R55] Syncope     1/23/2024  4:33 PM Vikki Fermin [I10] Primary hypertension           ED Disposition       ED Disposition   Admit    Condition   Stable    Date/Time   Tue Jan 23, 2024  4:32 PM    Comment   Case was discussed with Dr. Irizarry and the patient's admission status was agreed to be Admission Status: inpatient status to the service of Dr. Irizarry .               Follow-up Information    None         Patient's Medications   Discharge Prescriptions    No medications on file       No discharge procedures on file.    PDMP Review       None            ED Provider  Electronically Signed by             CELESTINA Staton  01/23/24 2540

## 2024-01-24 PROBLEM — R55 SYNCOPE: Status: ACTIVE | Noted: 2024-01-24

## 2024-01-24 LAB
ANION GAP SERPL CALCULATED.3IONS-SCNC: 7 MMOL/L
BUN SERPL-MCNC: 4 MG/DL (ref 5–25)
CALCIUM SERPL-MCNC: 9 MG/DL (ref 8.4–10.2)
CHLORIDE SERPL-SCNC: 102 MMOL/L (ref 96–108)
CO2 SERPL-SCNC: 27 MMOL/L (ref 21–32)
CREAT SERPL-MCNC: 0.64 MG/DL (ref 0.6–1.3)
GFR SERPL CREATININE-BSD FRML MDRD: 111 ML/MIN/1.73SQ M
GLUCOSE SERPL-MCNC: 100 MG/DL (ref 65–140)
HGB BLD-MCNC: 8.6 G/DL (ref 11.5–15.4)
HGB BLD-MCNC: 8.6 G/DL (ref 11.5–15.4)
HGB BLD-MCNC: 8.8 G/DL (ref 11.5–15.4)
IRON SATN MFR SERPL: 1 % (ref 15–50)
IRON SERPL-MCNC: 10 UG/DL (ref 50–212)
POTASSIUM SERPL-SCNC: 3.2 MMOL/L (ref 3.5–5.3)
SODIUM SERPL-SCNC: 136 MMOL/L (ref 135–147)
TIBC SERPL-MCNC: 728 UG/DL (ref 250–450)
TSH SERPL DL<=0.05 MIU/L-ACNC: 1.11 UIU/ML (ref 0.45–4.5)
UIBC SERPL-MCNC: 718 UG/DL (ref 155–355)

## 2024-01-24 PROCEDURE — 85018 HEMOGLOBIN: CPT | Performed by: INTERNAL MEDICINE

## 2024-01-24 PROCEDURE — 84443 ASSAY THYROID STIM HORMONE: CPT | Performed by: PHYSICIAN ASSISTANT

## 2024-01-24 PROCEDURE — C9113 INJ PANTOPRAZOLE SODIUM, VIA: HCPCS | Performed by: INTERNAL MEDICINE

## 2024-01-24 PROCEDURE — 99255 IP/OBS CONSLTJ NEW/EST HI 80: CPT | Performed by: INTERNAL MEDICINE

## 2024-01-24 PROCEDURE — 80048 BASIC METABOLIC PNL TOTAL CA: CPT | Performed by: INTERNAL MEDICINE

## 2024-01-24 PROCEDURE — 99233 SBSQ HOSP IP/OBS HIGH 50: CPT | Performed by: INTERNAL MEDICINE

## 2024-01-24 RX ORDER — BISACODYL 5 MG/1
10 TABLET, DELAYED RELEASE ORAL SEE ADMIN INSTRUCTIONS
Status: DISCONTINUED | OUTPATIENT
Start: 2024-01-24 | End: 2024-01-25 | Stop reason: HOSPADM

## 2024-01-24 RX ORDER — POTASSIUM CHLORIDE 20 MEQ/1
40 TABLET, EXTENDED RELEASE ORAL ONCE
Status: COMPLETED | OUTPATIENT
Start: 2024-01-24 | End: 2024-01-24

## 2024-01-24 RX ADMIN — PANTOPRAZOLE SODIUM 40 MG: 40 INJECTION, POWDER, FOR SOLUTION INTRAVENOUS at 16:36

## 2024-01-24 RX ADMIN — PANTOPRAZOLE SODIUM 40 MG: 40 INJECTION, POWDER, FOR SOLUTION INTRAVENOUS at 04:21

## 2024-01-24 RX ADMIN — POLYETHYLENE GLYCOL 3350, SODIUM SULFATE ANHYDROUS, SODIUM BICARBONATE, SODIUM CHLORIDE, POTASSIUM CHLORIDE 4000 ML: 236; 22.74; 6.74; 5.86; 2.97 POWDER, FOR SOLUTION ORAL at 16:36

## 2024-01-24 RX ADMIN — AMLODIPINE BESYLATE 10 MG: 5 TABLET ORAL at 09:06

## 2024-01-24 RX ADMIN — POTASSIUM CHLORIDE 40 MEQ: 1500 TABLET, EXTENDED RELEASE ORAL at 10:44

## 2024-01-24 RX ADMIN — IRON SUCROSE 200 MG: 20 INJECTION, SOLUTION INTRAVENOUS at 09:06

## 2024-01-24 NOTE — ASSESSMENT & PLAN NOTE
Unknown baseline  Present on admission 6.7, 1 unit PRBC ordered in the ED  Low MCV  Iron panel shows iron deficiency, B12 and folate normal  Will transfuse iron  Monitor H&H

## 2024-01-24 NOTE — PROGRESS NOTES
[facility]  Progress Note  Name: Esme Carl I  MRN: [id number]  Unit/Bed#: -[facility] I Date of Admission: 1/23/2024   Date of Service: 1/24/2024 I Hospital Day: 1    Assessment/Plan   Syncope  Assessment & Plan  Likely secondary to symptomatic anemia  EKG shows normal sinus rhythm  Monitor on telemetry today    Anemia  Assessment & Plan  Unknown baseline  Present on admission 6.7, 1 unit PRBC ordered in the ED  Low MCV  Iron panel shows iron deficiency, B12 and folate normal  Will transfuse iron  Monitor H&H      Dark stools  Assessment & Plan  Presenting with dark stools for almost a month  Has not been on NSAIDs  No Prior episodes  Never had EGD/Colonoscopy      Protonix 40 mg IV BID  H&H q.8 hours  Consult gastroenterology,  CLD until GI eval  Transfuse if Hb < 7, Type and screen  Monitor VS    Essential hypertension  Assessment & Plan  Continue Amlodipine, with hold parameters  Will hold chlorthalidone             VTE Pharmacologic Prophylaxis:   Low Risk (Score 0-2) - Encourage Ambulation.    Mobility:   Basic Mobility Inpatient Raw Score: 24  JH-HLM Goal: 8: Walk 250 feet or more  JH-HLM Achieved: 8: Walk 250 feet ot more  HLM Goal achieved. Continue to encourage appropriate mobility.    Patient Centered Rounds: I performed bedside rounds with nursing staff today.   Discussions with Specialists or Other Care Team Provider: GI    Education and Discussions with Family / Patient: Patient declined call to .     Total Time Spent on Date of Encounter in care of patient: 54 mins. This time was spent on one or more of the following: performing physical exam; counseling and coordination of care; obtaining or reviewing history; documenting in the medical record; reviewing/ordering tests, medications or procedures; communicating with other healthcare professionals and discussing with patient's family/caregivers.    Current Length of Stay: 1 day(s)  Current  Patient Status: Inpatient   Certification Statement: The patient will continue to require additional inpatient hospital stay due to possible endoscopy  Discharge Plan:  Pending decision    Code Status: Level 1 - Full Code    Subjective:   Patient was communicated today with the help of  [id number].  Stated abdominal pain is better, no nausea or vomiting.  No dark bowel movements overnight.    Objective:     Vitals:   Temp (24hrs), Av.3 °F (36.8 °C), Min:97.9 °F (36.6 °C), Max:98.7 °F (37.1 °C)    Temp:  [97.9 °F (36.6 °C)-98.7 °F (37.1 °C)] 98.3 °F (36.8 °C)  HR:  [71-82] 75  Resp:  [16-20] 16  BP: (150-218)/() 150/89  SpO2:  [99 %-100 %] 99 %  Body mass index is 29.89 kg/m².     Input and Output Summary (last 24 hours):     Intake/Output Summary (Last 24 hours) at 2024 1229  Last data filed at 2024 0905  Gross per 24 hour   Intake 1540 ml   Output --   Net 1540 ml       Physical Exam:   Physical Exam  Vitals and nursing note reviewed.   Constitutional:       General: She is not in acute distress.     Appearance: She is well-developed.   HENT:      Head: Normocephalic and atraumatic.      Mouth/Throat:      Mouth: Mucous membranes are moist.      Pharynx: Oropharynx is clear.   Eyes:      Conjunctiva/sclera: Conjunctivae normal.   Cardiovascular:      Rate and Rhythm: Normal rate and regular rhythm.      Heart sounds: No murmur heard.  Pulmonary:      Effort: Pulmonary effort is normal. No respiratory distress.      Breath sounds: Normal breath sounds.   Abdominal:      Palpations: Abdomen is soft.      Tenderness: There is no abdominal tenderness.   Musculoskeletal:         General: No swelling.      Cervical back: Neck supple.   Skin:     General: Skin is warm and dry.      Capillary Refill: Capillary refill takes less than 2 seconds.   Neurological:      Mental Status: She is alert.   Psychiatric:         Mood and Affect: Mood normal.          Additional Data:     Labs:  Results from  last 7 days   Lab Units 01/24/24  0950 01/24/24  0120 01/23/24  1355   WBC Thousand/uL  --   --  5.85   HEMOGLOBIN g/dL 8.6*   < > 6.7*   HEMATOCRIT %  --   --  25.7*   PLATELETS Thousands/uL  --   --  447*   NEUTROS PCT %  --   --  54   LYMPHS PCT %  --   --  34   MONOS PCT %  --   --  10   EOS PCT %  --   --  1    < > = values in this interval not displayed.     Results from last 7 days   Lab Units 01/24/24  0121 01/23/24  1355   SODIUM mmol/L 136 138   POTASSIUM mmol/L 3.2* 3.4*   CHLORIDE mmol/L 102 104   CO2 mmol/L 27 29   BUN mg/dL 4* 9   CREATININE mg/dL 0.64 0.55*   ANION GAP mmol/L 7 5   CALCIUM mg/dL 9.0 8.7   ALBUMIN g/dL  --  4.3   TOTAL BILIRUBIN mg/dL  --  0.26   ALK PHOS U/L  --  60   ALT U/L  --  13   AST U/L  --  17   GLUCOSE RANDOM mg/dL 100 94     Results from last 7 days   Lab Units 01/23/24  1355   INR  1.01                   Lines/Drains:  Invasive Devices       Peripheral Intravenous Line  Duration             Peripheral IV 01/24/24 Distal;Dorsal (posterior);Left Forearm <1 day                          Imaging: No pertinent imaging reviewed.    Recent Cultures (last 7 days):         Last 24 Hours Medication List:   Current Facility-Administered Medications   Medication Dose Route Frequency Provider Last Rate    acetaminophen  650 mg Oral Q6H PRN Jessica Moore DO      iron sucrose  200 mg Intravenous Daily Rosario Irizarry MD 0 mg (01/24/24 0942)    labetalol  10 mg Intravenous TID PRN Rosario Irizarry MD      pantoprazole  40 mg Intravenous Q12H Rosario Irizarry MD          Today, Patient Was Seen By: Rosario Irizarry MD    **Please Note: This note may have been constructed using a voice recognition system.**

## 2024-01-24 NOTE — UTILIZATION REVIEW
Initial Clinical Review    Admission: Date/Time/Statement:   Admission Orders (From admission, onward)       Ordered        01/23/24 1633  INPATIENT ADMISSION  Once                          Orders Placed This Encounter   Procedures    INPATIENT ADMISSION     Standing Status:   Standing     Number of Occurrences:   1     Order Specific Question:   Level of Care     Answer:   Med Surg [16]     Order Specific Question:   Estimated length of stay     Answer:   More than 2 Midnights     Order Specific Question:   Certification     Answer:   I certify that inpatient services are medically necessary for this patient for a duration of greater than two midnights. See H&P and MD Progress Notes for additional information about the patient's course of treatment.     ED Arrival Information       Expected   -    Arrival   1/23/2024 13:18    Acuity   Urgent              Means of arrival   Walk-In    Escorted by   Friend    Service   Hospitalist    Admission type   Emergency              Arrival complaint   syncope             Chief Complaint   Patient presents with    Syncope     Pt was standing, got dizzy and passed out. Pt denies striking head. Pt feels tired and has a headache.        Initial Presentation: 38 y.o. female who presented self from home to [facility] ED. Inpatient admission for evaluation and treatment of anemia, GI bleed. PMHx: HTN, psychiatric disorder. Presented w/ syncope. Does report feeling more tired than usual for the past few weeks. Otherwise no presyncopal symptoms. Does note dark stools for the past month, and heavier menstrual periods that last 5-6 days. On exam, pallor, LLQ tenderness. Hgb 6.7. Plan: transfuse 1 unit pRBCs, check iron panel, B12, folate, IV venofer, trend H&H q8h, IV PPI BID, clear liquids, hold chlorthalidone, continue amlodipine, Trend labs, replete electrolytes as needed. GI consulted.      Date: 01/24/24   Day 2: Reports abdominal pain improved. No dark bowel movements  overnight. Exam unremarkable. Plan: telemetry, IV venofer, trend H&H. IV PPI BID. Continue current meds. Trend labs, replete electrolytes as needed.    GI: Epigastric pain. Clear liquids, IVF. Analgesics. IV PPI BID. Trend H&H. IV venofer. Endoscopy and colonoscopy tomorrow to evaluate for GI bleed source and abnormal results from CT of colon. Check TSH.     ED Triage Vitals   Temperature Pulse Respirations Blood Pressure SpO2   01/23/24 1332 01/23/24 1332 01/23/24 1332 01/23/24 1332 01/23/24 1332   98.2 °F (36.8 °C) 79 18 (!) 166/106 100 %      Temp Source Heart Rate Source Patient Position - Orthostatic VS BP Location FiO2 (%)   01/23/24 1724 01/23/24 1500 01/23/24 1500 01/23/24 1500 --   Oral Monitor Lying Right arm       Pain Score       01/23/24 1511       Med Not Given for Pain - for MAR use only          Wt Readings from Last 1 Encounters:   01/23/24 67.1 kg (148 lb)     Additional Vital Signs:   Date/Time Temp Pulse Resp BP MAP (mmHg) SpO2 O2 Device   01/24/24 09:05:24 98.3 °F (36.8 °C) 75 16 150/89 109 99 % None (Room air)   01/24/24 0900 -- -- 16 -- -- -- None (Room air)   01/23/24 1927 98.3 °F (36.8 °C) 73 20 152/89 -- -- --   01/23/24 19:06:24 98.3 °F (36.8 °C) 73 20 152/89 110 99 % --   01/23/24 1900 98.3 °F (36.8 °C) 75 -- 152/89 -- -- --   01/23/24 18:22:35 97.9 °F (36.6 °C) 76 18 155/87 110 99 % --   01/23/24 1739 98.7 °F (37.1 °C) 80 18 186/101 Abnormal  -- 100 % None (Room air)   01/23/24 1724 98.5 °F (36.9 °C) 73 18 185/100 Abnormal  -- 100 % None (Room air)   01/23/24 1633 -- 82 -- 186/111 Abnormal  141 100 % --   01/23/24 1604 -- -- -- 175/89 Abnormal  -- -- --   01/23/24 1514 -- -- -- 218/109 Abnormal  154 -- --   01/23/24 1512 -- -- -- 179/106 Abnormal  134 -- --   01/23/24 1500 -- 71 18 158/83 114 100 % None (Room air)     Pertinent Labs/Diagnostic Test Results:   1/23 - EKG  Normal sinus rhythm  ST & T wave abnormality, consider inferolateral ischemia    CT head without contrast   Final  Result by Garfield Austin MD (01/23 1604)      No acute intracranial abnormality.                  Workstation performed: [id number]         CT abdomen pelvis with contrast   Final Result by Catalino Resendez MD (01/23 1603)      Questionable ascending colonic wall thickening, which may be related to underdistention versus mild colitis in the appropriate clinical setting. Consider follow-up colonoscopy for further evaluation, given anemia      Otherwise, no acute abnormality in the abdomen or pelvis.         Workstation performed: [id number]         XR chest 2 views   ED Interpretation by CELESTINA Staton (01/23 1424)   No acute cardiopulmonary disease identified by me.      Final Result by Chad Medrano MD (01/23 1443)      No acute cardiopulmonary disease.                  Workstation performed: [id number]               Results from last 7 days   Lab Units 01/24/24  0120 01/23/24  1355   WBC Thousand/uL  --  5.85   HEMOGLOBIN g/dL 8.6* 6.7*   HEMATOCRIT %  --  25.7*   PLATELETS Thousands/uL  --  447*   NEUTROS ABS Thousands/µL  --  3.20         Results from last 7 days   Lab Units 01/24/24  0121 01/23/24  1355   SODIUM mmol/L 136 138   POTASSIUM mmol/L 3.2* 3.4*   CHLORIDE mmol/L 102 104   CO2 mmol/L 27 29   ANION GAP mmol/L 7 5   BUN mg/dL 4* 9   CREATININE mg/dL 0.64 0.55*   EGFR ml/min/1.73sq m 111 117   CALCIUM mg/dL 9.0 8.7     Results from last 7 days   Lab Units 01/23/24  1355   AST U/L 17   ALT U/L 13   ALK PHOS U/L 60   TOTAL PROTEIN g/dL 7.9   ALBUMIN g/dL 4.3   TOTAL BILIRUBIN mg/dL 0.26         Results from last 7 days   Lab Units 01/24/24  0121 01/23/24  1355   GLUCOSE RANDOM mg/dL 100 94      Results from last 7 days   Lab Units 01/23/24  1601 01/23/24  1355   HS TNI 0HR ng/L  --  5   HS TNI 2HR ng/L 5  --    HSTNI D2 ng/L 0  --          Results from last 7 days   Lab Units 01/23/24  1355   PROTIME seconds 13.7   INR  1.01   PTT seconds 29      Results from last 7 days   Lab Units 01/23/24  1335    FERRITIN ng/mL 1*   IRON SATURATION % 1*   IRON ug/dL 10*   TIBC ug/dL 728*      Results from last 7 days   Lab Units 01/23/24  1355   LIPASE u/L 74      Results from last 7 days   Lab Units 01/23/24  1558   CLARITY UA  Clear   COLOR UA  Colorless   SPEC GRAV UA  1.010   PH UA  7.5   GLUCOSE UA mg/dl Negative   KETONES UA mg/dl Negative   BLOOD UA  Trace*   PROTEIN UA mg/dl Negative   NITRITE UA  Negative   BILIRUBIN UA  Negative   UROBILINOGEN UA (BE) mg/dl <2.0   LEUKOCYTES UA  Negative   WBC UA /hpf None Seen   RBC UA /hpf None Seen   BACTERIA UA /hpf Occasional   EPITHELIAL CELLS WET PREP /hpf Occasional          ED Treatment:   Medication Administration from 01/23/2024 1317 to 01/23/2024 1819         Date/Time Order Dose Route Action     01/23/2024 1507 EST sodium chloride 0.9 % bolus 1,000 mL 1,000 mL Intravenous New Bag     01/23/2024 1511 EST acetaminophen (TYLENOL) tablet 650 mg 650 mg Oral Given     01/23/2024 1604 EST amLODIPine (NORVASC) tablet 10 mg 10 mg Oral Given     01/23/2024 1604 EST chlorthalidone tablet 25 mg 25 mg Oral Given     01/23/2024 1542 EST iohexol (OMNIPAQUE) 350 MG/ML injection (SINGLE-DOSE) 100 mL 100 mL Intravenous Given     01/23/2024 1721 EST pantoprazole (PROTONIX) injection 40 mg 40 mg Intravenous Given          Past Medical History:   Diagnosis Date    Depression     Patient believes she suffers from depression due to going through a process with Immigration.    Hypertension      Present on Admission:   Essential hypertension      Admitting Diagnosis: Syncope [R55]  Primary hypertension [I10]  Upper GI bleed [K92.2]  Symptomatic anemia [D64.9]  Age/Sex: 38 y.o. female  Admission Orders:  Clear Liquid Diet.  I&O.   Q8h H&H.  SCDs.    Scheduled Medications:  iron sucrose, 200 mg, Intravenous, Daily  pantoprazole, 40 mg, Intravenous, Q12H    Continuous IV Infusions: none    PRN Meds:  acetaminophen, 650 mg, Oral, Q6H PRN; 1/23 x1  amlodipine, 10 mg, Oral; 1/24 x1  labetalol, 10  mg, Intravenous, TID PRN        IP CONSULT TO GASTROENTEROLOGY    Network Utilization Review Department  ATTENTION: Please call with any questions or concerns to [phone] and carefully listen to the prompts so that you are directed to the right person. All voicemails are confidential.   For Discharge needs, contact Care Management DC Support Team at [phone] opt. 2  Send all requests for admission clinical reviews, approved or denied determinations and any other requests to dedicated fax number below belonging to the [facility] where the patient is receiving treatment. List of dedicated fax numbers for the Facilities:  FACILITY NAME UR FAX NUMBER   ADMISSION DENIALS (Administrative/Medical Necessity) [phone]   DISCHARGE SUPPORT TEAM (NETWORK) [phone]   PARENT CHILD HEALTH (Maternity/NICU/Pediatrics) [phone]   [facility] [phone]   [facility] [phone]   [facility] [phone]   [facility] [phone]   [facility] [phone]   [facility] [phone]   [facility] [phone]   [facility] [phone]   [facility] [phone]   [facility] [phone]   [facility] [phone]

## 2024-01-24 NOTE — PLAN OF CARE
Problem: PAIN - ADULT  Goal: Verbalizes/displays adequate comfort level or baseline comfort level  Description: Interventions:  - Encourage patient to monitor pain and request assistance  - Assess pain using appropriate pain scale  - Administer analgesics based on type and severity of pain and evaluate response  - Implement non-pharmacological measures as appropriate and evaluate response  - Consider cultural and social influences on pain and pain management  - Notify physician/advanced practitioner if interventions unsuccessful or patient reports new pain  Outcome: Progressing     Problem: Knowledge Deficit  Goal: Patient/family/caregiver demonstrates understanding of disease process, treatment plan, medications, and discharge instructions  Description: Complete learning assessment and assess knowledge base.  Interventions:  - Provide teaching at level of understanding  - Provide teaching via preferred learning methods  Outcome: Progressing     Problem: DISCHARGE PLANNING  Goal: Discharge to home or other facility with appropriate resources  Description: INTERVENTIONS:  - Identify barriers to discharge w/patient and caregiver  - Arrange for needed discharge resources and transportation as appropriate  - Identify discharge learning needs (meds, wound care, etc.)  - Arrange for interpretive services to assist at discharge as needed  - Refer to Case Management Department for coordinating discharge planning if the patient needs post-hospital services based on physician/advanced practitioner order or complex needs related to functional status, cognitive ability, or social support system  Outcome: Progressing

## 2024-01-24 NOTE — CONSULTS
Consultation - [facility] Gastroenterology     Esme Carl 38 y.o. female MRN: [id number]  Unit/Bed#: -01 Encounter: [id number]    Inpatient consult to gastroenterology  Consult performed by: Leny Garzon PA-C  Consult ordered by: Leny Garzon PA-C          ASSESSMENT and PLAN  Esme Carl is a 38 y.o. year old female with a past medical history of hypertension and depression who presents to the ER with syncope and symptomatic iron deficiency anemia, melena, rectal bleeding, epigastric pain, 6 lb weight loss due to decreased appetite and heavy menses.  On admission hemoglobin 6.7 received 1 unit packed red cells repeat hemoglobin 8.6.  CT A/P with contrast shows questionable ascending colon thickening due to colitis versus underdistention.  Patient currently hemodynamically stable without evidence of an active large-volume GI bleed at this time.  Will need GI workup with panendoscopy if unremarkable consider video capsule endoscopy as outpatient and GYN evaluation given heavy menses.    1.  Symptomatic iron deficiency anemia  2.  GI bleed  3.  Abnormal CT scan  4.  Anorexia and weight loss  5.  Epigastric pain   -Clear liquids, IV fluids, pain control   -Agree with PPI 40 mg IV twice daily   -Monitor hemoglobin transfuse as needed.  On IV iron per primary team   -Endoscopy and colonoscopy likely tomorrow to eval for source of GI bleeding and abn CT colon  -If GI workup negative consider GYN evaluation given heavy menses and VCE as outpatient to evaluate small bowel   -Check TSH    6.  Hypokalemia   -Defer to primary team regarding monitoring and correcting hypokalemia and electrolyte abnormalities    Chief Complaint   Patient presents with    Syncope     Pt was standing, got dizzy and passed out. Pt denies striking head. Pt feels tired and has a headache.        Physician Requesting Consult: Rosario Irizarry MD    HPI    Esme Carl is a 38 y.o. year old female  with a past medical history of hypertension and depression who presents to the ER with syncope and symptomatic iron deficiency anemia.  Patient is [place]-speaking history obtained via  from language line on iPad.  She has never seen GI never had endoscopy or colonoscopy.  Patient reports she has been told in the past she was anemic and was on iron but not taking recently denies history of blood transfusion.  She has had heavy menses occurring once a month for 6 days using 4 pads a day.  In the last 1 month she has been seeing black stools and occasional painless scant bright red blood per rectum when wiping.  Denies iron, Pepto-Bismol or Kaopectate use.  She does complain of epigastric discomfort radiating to the back that is improved with eating occurring for the past 1 month.  She notes poor appetite and has lost 6 pounds unintentionally.  She denies NSAID use.      On admission hemoglobin 6.7 received 1 unit packed red cells repeat hemoglobin 8.6.  Labs consistent with iron deficiency anemia also with hypokalemia.  CT of the head negative.  CT A/P with contrast shows questionable ascending colon thickening due to colitis versus underdistention.        ROS:   Constitutional: + fatigue, fever.  HEENT: denies visual disturbance, postnasal drip, sore throat.  Respiratory: denies cough, shortness of breath.  Cardiovascular: denies chest pain, leg swelling.  Gastrointestinal: as noted above in HPI.  : denies difficulty urinating, dysuria.  Musculoskeletal: denies arthralgias, back pain.  Neurological: denies dizziness, +syncope.  Psychiatric: denies confusion, anxiety.    Historical Information   Past Medical History:   Diagnosis Date    Depression     Patient believes she suffers from depression due to going through a process with Immigration.    Hypertension      Past Surgical History:   Procedure Laterality Date     SECTION      as per patient total of 3 c-sections due to hypertension     TUBAL LIGATION      Patient believes she had a tubal ligation done.  Did not seem to certain     Social History   Social History     Substance and Sexual Activity   Alcohol Use Never     Social History     Substance and Sexual Activity   Drug Use Never     Social History     Tobacco Use   Smoking Status Never   Smokeless Tobacco Never     Family History   Problem Relation Age of Onset    Hypertension Mother     No Known Problems Father     No Known Problems Sister     No Known Problems Brother     No Known Problems Son     No Known Problems Daughter        Meds/Allergies     Current Facility-Administered Medications   Medication Dose Route Frequency    acetaminophen (TYLENOL) tablet 650 mg  650 mg Oral Q6H PRN    iron sucrose (VENOFER) 200 mg in sodium chloride 0.9% 100 ml IVPB 200 mg  200 mg Intravenous Daily    labetalol (NORMODYNE) injection 10 mg  10 mg Intravenous TID PRN    pantoprazole (PROTONIX) injection 40 mg  40 mg Intravenous Q12H     Medications Prior to Admission   Medication    amLODIPine (NORVASC) 10 mg tablet    chlorthalidone 25 mg tablet    cyclobenzaprine (FLEXERIL) 10 mg tablet    meloxicam (MOBIC) 15 mg tablet       Allergies   Allergen Reactions    Enalapril Cough     Scratchy throat       PHYSICAL EXAM:    Constitutional: Well-developed, no acute distress  HEENT: normocephalic, mucous membranes moist.  Neck: Supple  Skin: warm and dry  Respiratory: Lungs are clear to auscultation B/L.  Cardiovascular: Heart is regular rate and rhythm.  Gastrointestinal: Soft, mild epigastric tenderness, nondistended with normal active bowel sounds.  No masses, guarding, rebound.   Rectal Exam: Deferred.  Extremities: No edema.  Neurologic: Nonfocal. A & O ×3.   Psychiatric: Normal affect.      Lab Results   Component Value Date    CALCIUM 9.0 01/24/2024    K 3.2 (L) 01/24/2024    CO2 27 01/24/2024     01/24/2024    BUN 4 (L) 01/24/2024    CREATININE 0.64 01/24/2024    CREATININE 0.55 (L) 01/23/2024  "   CREATININE 0.60 09/02/2020     Lab Results   Component Value Date    WBC 5.85 01/23/2024    HGB 8.6 (L) 01/24/2024    HGB 8.6 (L) 01/24/2024    HGB 6.7 (LL) 01/23/2024    MCV 56 (L) 01/23/2024     (H) 01/23/2024     Lab Results   Component Value Date    ALT 13 01/23/2024    ALT 32 09/02/2020    AST 17 01/23/2024    AST 20 09/02/2020    ALKPHOS 60 01/23/2024    ALKPHOS 79 09/02/2020    TBILI 0.26 01/23/2024    TBILI 0.30 09/02/2020     No results found for: \"AMYLASE\"  Lab Results   Component Value Date    LIPASE 74 01/23/2024     Lab Results   Component Value Date    IRON 10 (L) 01/23/2024    TIBC 728 (H) 01/23/2024    FERRITIN 1 (L) 01/23/2024     Lab Results   Component Value Date    INR 1.01 01/23/2024       CT head without contrast    Result Date: 1/23/2024  Narrative: CT BRAIN - WITHOUT CONTRAST INDICATION:   Severe headaches worsening x1 month, +N, syncope. COMPARISON:  None. TECHNIQUE:  CT examination of the brain was performed.  Multiplanar 2D reformatted images were created from the source data. Radiation dose length product (DLP) for this visit:  891 mGy-cm .  This examination, like all CT scans performed in the [facility], was performed utilizing techniques to minimize radiation dose exposure, including the use of iterative reconstruction and automated exposure control. IMAGE QUALITY:  Diagnostic. FINDINGS: PARENCHYMA:  No intracranial mass, mass effect or midline shift. No CT signs of acute infarction.  No acute parenchymal hemorrhage. VENTRICLES AND EXTRA-AXIAL SPACES:  Normal for the patient's age. VISUALIZED ORBITS: Normal visualized orbits. PARANASAL SINUSES: Mild polypoid mucosal thickening of the visualized maxillary sinuses. CALVARIUM AND EXTRACRANIAL SOFT TISSUES:  Normal.     Impression: No acute intracranial abnormality. Workstation performed: [id number]     CT abdomen pelvis with contrast    Result Date: 1/23/2024  Narrative: CT ABDOMEN AND PELVIS WITH IV CONTRAST " INDICATION: Epigastric pain Epigastric abd pain, +N. COMPARISON: None. TECHNIQUE: CT examination of the abdomen and pelvis was performed. Multiplanar 2D reformatted images were created from the source data. This examination, like all CT scans performed in the [facility], was performed utilizing techniques to minimize radiation dose exposure, including the use of iterative reconstruction and automated exposure control. Radiation dose length product (DLP) for this visit: 475.72 mGy-cm IV Contrast: 100 mL of iohexol (OMNIPAQUE) Enteric Contrast: Not administered. FINDINGS: ABDOMEN LOWER CHEST: Small hiatal hernia. No other clinically significant abnormality in the visualized lower chest. LIVER/BILIARY TREE: Unremarkable. GALLBLADDER: No calcified gallstones. No pericholecystic inflammatory change. SPLEEN: Unremarkable. PANCREAS: Unremarkable. ADRENAL GLANDS: Unremarkable. KIDNEYS/URETERS: Unremarkable. No hydronephrosis. STOMACH AND BOWEL: Questionable ascending colonic wall thickening. Otherwise, within normal limits. APPENDIX: No findings to suggest appendicitis. ABDOMINOPELVIC CAVITY: No ascites. No pneumoperitoneum. No lymphadenopathy. VESSELS: Unremarkable atherosclerotic for patient's age. PELVIS REPRODUCTIVE ORGANS: Probable small uterine fibroids. URINARY BLADDER: Unremarkable. ABDOMINAL WALL/INGUINAL REGIONS: Unremarkable. BONES: No acute fracture or suspicious osseous lesion.     Impression: Questionable ascending colonic wall thickening, which may be related to underdistention versus mild colitis in the appropriate clinical setting. Consider follow-up colonoscopy for further evaluation, given anemia Otherwise, no acute abnormality in the abdomen or pelvis. Workstation performed: [id number]     XR chest 2 views    Result Date: 1/23/2024  Narrative: CHEST INDICATION:   Chest Pain. COMPARISON:  None EXAM PERFORMED/VIEWS:  XR CHEST PA & LATERAL The frontal view was performed utilizing dual energy  radiographic technique. Images: 4 FINDINGS: Cardiomediastinal silhouette appears unremarkable. The lungs are clear.  No pneumothorax or pleural effusion. Osseous structures appear within normal limits for patient age.     Impression: No acute cardiopulmonary disease. Workstation performed: [id number]       Imaging Studies: I have personally reviewed pertinent reports.      Pathology, and Other Studies: I have personally reviewed pertinent reports.      Patient expressed understanding and had all questions and concerns addressed.    Leny Garzon PA-C  01/24/24   10:53 AM     Counseling / Coordination of Care  Total floor / unit time spent today 45 minutes.     This chart was completed in part utilizing Storie speech voice recognition software. Random word insertions, pronoun errors, and incomplete sentences are an occasional consequence of this system due to software limitations, and ambient noise. Any questions or concerns about the content, text, or information contained within the body of this dictation should be directly addressed to the provider for clarification.

## 2024-01-24 NOTE — CASE MANAGEMENT
Case Management Assessment & Discharge Planning Note    Patient name Esme Carl  Location /-01 MRN [id number]  : 1985 Date 2024       Current Admission Date: 2024  Current Admission Diagnosis:Dark stools   Patient Active Problem List    Diagnosis Date Noted    Syncope 2024    Dark stools 2024    Anemia 2024    Essential hypertension 2020      LOS (days): 1  Geometric Mean LOS (GMLOS) (days):   Days to GMLOS:     OBJECTIVE:    Risk of Unplanned Readmission Score: 6.26         Current admission status: Inpatient       Preferred Pharmacy:   PATIENT/FAMILY REPORTS NO PREFERRED PHARMACY  No address on file      [id number] - [place] - [address]  [address] [address]  Phone: [phone] Fax: [phone]    Primary Care Provider: Hussein Ruano DO    Primary Insurance:   Secondary Insurance:     ASSESSMENT:  Active Health Care Proxies    There are no active Health Care Proxies on file.       Readmission Root Cause  30 Day Readmission: No    Patient Information  Admitted from:: Home  Mental Status: Alert  During Assessment patient was accompanied by: Not accompanied during assessment  Assessment information provided by:: Patient  Primary Caregiver: Self  Support Systems: Self, Friend  County of Residence: [place]  What [facility] do you live in?: [place]  Home entry access options. Select all that apply.: No steps to enter home, Stairs (flight to 2nd floor)  Number of steps to enter home.: One Flight  Do the steps have railings?: Yes  Type of Current Residence: 2 story home (rents room on 2nd floor)  Upon entering residence, is there a bedroom on the main floor (no further steps)?: No  A bedroom is located on the following floor levels of residence (select all that apply):: 2nd Floor  Upon entering residence, is there a bathroom on the main floor (no further steps)?: No  Indicate which floors of current  residence have a bathroom (select all the apply):: 2nd Floor  Number of steps to 2nd floor from main floor: One Flight  Living Arrangements: Lives Alone  Is patient a ?: No    Activities of Daily Living Prior to Admission  Functional Status: Independent  Completes ADLs independently?: Yes  Ambulates independently?: Yes  Does patient use assisted devices?: No  Does patient currently own DME?: No  Does patient have a history of Outpatient Therapy (PT/OT)?: No  Does the patient have a history of Short-Term Rehab?: No  Does patient have a history of HHC?: No  Does patient currently have HHC?: No    Patient Information Continued  Income Source: Unemployed  Does patient have prescription coverage?: No  Does patient receive dialysis treatments?: No  Does patient have a history of substance abuse?: No  Does patient have a history of Mental Health Diagnosis?: No    Means of Transportation  Means of Transport to Appts:: Friends      Housing Stability: Patient Unable To Answer (1/24/2024)    Housing Stability Vital Sign     Unable to Pay for Housing in the Last Year: Patient unable to answer     Number of Places Lived in the Last Year: Not on file     Unstable Housing in the Last Year: Patient unable to answer   Food Insecurity: Patient Unable To Answer (1/24/2024)    Hunger Vital Sign     Worried About Running Out of Food in the Last Year: Patient unable to answer     Ran Out of Food in the Last Year: Patient unable to answer   Transportation Needs: Patient Unable To Answer (1/24/2024)    PRAPARE - Transportation     Lack of Transportation (Medical): Patient unable to answer     Lack of Transportation (Non-Medical): Patient unable to answer   Utilities: Patient Unable To Answer (1/24/2024)    [facility] Utilities     Threatened with loss of utilities: Patient unable to answer       DISCHARGE DETAILS:    Discharge planning discussed with:: patient    Additional Comments: Acknowledge Pt is Persian speaking. Spoke with Pt via   (Dre ID # [id number]). Discussed role of case management. Pt reports she rents a room on 2nd floor of 2sh and lives alone. No ginger, flight of steps to 2nd floor. Independent PTA. Denies hx of VNA/SNF/MH/D&A. Friend provides transportation. Pt uses [facility] pharmacy in [place]. Pt reports she does not have healthcare insurance and agreeable for referral to [facility]. Referral made to [facility]. CM to follow.

## 2024-01-24 NOTE — PLAN OF CARE
Problem: PAIN - ADULT  Goal: Verbalizes/displays adequate comfort level or baseline comfort level  Description: Interventions:  - Encourage patient to monitor pain and request assistance  - Assess pain using appropriate pain scale  - Administer analgesics based on type and severity of pain and evaluate response  - Implement non-pharmacological measures as appropriate and evaluate response  - Consider cultural and social influences on pain and pain management  - Notify physician/advanced practitioner if interventions unsuccessful or patient reports new pain  Outcome: Progressing     Problem: INFECTION - ADULT  Goal: Absence or prevention of progression during hospitalization  Description: INTERVENTIONS:  - Assess and monitor for signs and symptoms of infection  - Monitor lab/diagnostic results  - Monitor all insertion sites, i.e. indwelling lines, tubes, and drains  - Monitor endotracheal if appropriate and nasal secretions for changes in amount and color  - [place]oca appropriate cooling/warming therapies per order  - Administer medications as ordered  - Instruct and encourage patient and family to use good hand hygiene technique  - Identify and instruct in appropriate isolation precautions for identified infection/condition  Outcome: Progressing     Problem: SAFETY ADULT  Goal: Patient will remain free of falls  Description: INTERVENTIONS:  - Educate patient/family on patient safety including physical limitations  - Instruct patient to call for assistance with activity   - Consult OT/PT to assist with strengthening/mobility   - Keep Call bell within reach  - Keep bed low and locked with side rails adjusted as appropriate  - Keep care items and personal belongings within reach  - Initiate and maintain comfort rounds  - Make Fall Risk Sign visible to staff  - Obtain necessary fall risk management equipment: non-slip socks  - Apply yellow socks and bracelet for high fall risk patients  - Consider moving patient to  room near nurses station  Outcome: Progressing  Goal: Maintain or return to baseline ADL function  Description: INTERVENTIONS:  -  Assess patient's ability to carry out ADLs; assess patient's baseline for ADL function and identify physical deficits which impact ability to perform ADLs (bathing, care of mouth/teeth, toileting, grooming, dressing, etc.)  - Assess/evaluate cause of self-care deficits   - Assess range of motion  - Assess patient's mobility; develop plan if impaired  - Assess patient's need for assistive devices and provide as appropriate  - Encourage maximum independence but intervene and supervise when necessary  - Involve family in performance of ADLs  - Assess for home care needs following discharge   - Consider OT consult to assist with ADL evaluation and planning for discharge  - Provide patient education as appropriate  Outcome: Progressing  Goal: Maintains/Returns to pre admission functional level  Description: INTERVENTIONS:  - Perform AM-PAC 6 Click Basic Mobility/ Daily Activity assessment daily.  - Set and communicate daily mobility goal to care team and patient/family/caregiver.   - Collaborate with rehabilitation services on mobility goals if consulted  - Perform Range of Motion 3 times a day.  - Ambulate patient 3 times a day  - Out of bed to chair 3 times a day for meals  - Out of bed for toileting  - Record patient progress and toleration of activity level   Outcome: Progressing     Problem: DISCHARGE PLANNING  Goal: Discharge to home or other facility with appropriate resources  Description: INTERVENTIONS:  - Identify barriers to discharge w/patient and caregiver  - Arrange for needed discharge resources and transportation as appropriate  - Identify discharge learning needs (meds, wound care, etc.)  - Arrange for interpretive services to assist at discharge as needed  - Refer to Case Management Department for coordinating discharge planning if the patient needs post-hospital services  based on physician/advanced practitioner order or complex needs related to functional status, cognitive ability, or social support system  Outcome: Progressing     Problem: Knowledge Deficit  Goal: Patient/family/caregiver demonstrates understanding of disease process, treatment plan, medications, and discharge instructions  Description: Complete learning assessment and assess knowledge base.  Interventions:  - Provide teaching at level of understanding  - Provide teaching via preferred learning methods  Outcome: Progressing     Problem: GASTROINTESTINAL - ADULT  Goal: Maintains or returns to baseline bowel function  Description: INTERVENTIONS:  - Assess bowel function  - Encourage oral fluids to ensure adequate hydration  - Administer IV fluids if ordered to ensure adequate hydration  - Administer ordered medications as needed  - Encourage mobilization and activity  - Consider nutritional services referral to assist patient with adequate nutrition and appropriate food choices  Outcome: Progressing     Problem: HEMATOLOGIC - ADULT  Goal: Maintains hematologic stability  Description: INTERVENTIONS  - Assess for signs and symptoms of bleeding or hemorrhage  - Monitor labs  - Administer supportive blood products/factors as ordered and appropriate  Outcome: Progressing

## 2024-01-25 ENCOUNTER — TELEPHONE (OUTPATIENT)
Dept: GASTROENTEROLOGY | Facility: CLINIC | Age: 39
End: 2024-01-25

## 2024-01-25 ENCOUNTER — APPOINTMENT (INPATIENT)
Dept: GASTROENTEROLOGY | Facility: HOSPITAL | Age: 39
DRG: 663 | End: 2024-01-25
Payer: COMMERCIAL

## 2024-01-25 ENCOUNTER — ANESTHESIA EVENT (INPATIENT)
Dept: GASTROENTEROLOGY | Facility: HOSPITAL | Age: 39
DRG: 663 | End: 2024-01-25
Payer: COMMERCIAL

## 2024-01-25 ENCOUNTER — ANESTHESIA (INPATIENT)
Dept: GASTROENTEROLOGY | Facility: HOSPITAL | Age: 39
DRG: 663 | End: 2024-01-25
Payer: COMMERCIAL

## 2024-01-25 VITALS
BODY MASS INDEX: 29.84 KG/M2 | OXYGEN SATURATION: 99 % | HEIGHT: 59 IN | DIASTOLIC BLOOD PRESSURE: 93 MMHG | WEIGHT: 148 LBS | TEMPERATURE: 97.3 F | RESPIRATION RATE: 23 BRPM | SYSTOLIC BLOOD PRESSURE: 153 MMHG | HEART RATE: 70 BPM

## 2024-01-25 LAB
ANION GAP SERPL CALCULATED.3IONS-SCNC: 9 MMOL/L
BASOPHILS # BLD AUTO: 0.03 THOUSANDS/ÂΜL (ref 0–0.1)
BASOPHILS NFR BLD AUTO: 1 % (ref 0–1)
BUN SERPL-MCNC: 6 MG/DL (ref 5–25)
CALCIUM SERPL-MCNC: 9 MG/DL (ref 8.4–10.2)
CHLORIDE SERPL-SCNC: 100 MMOL/L (ref 96–108)
CO2 SERPL-SCNC: 28 MMOL/L (ref 21–32)
CREAT SERPL-MCNC: 0.71 MG/DL (ref 0.6–1.3)
EOSINOPHIL # BLD AUTO: 0.16 THOUSAND/ÂΜL (ref 0–0.61)
EOSINOPHIL NFR BLD AUTO: 3 % (ref 0–6)
ERYTHROCYTE [DISTWIDTH] IN BLOOD BY AUTOMATED COUNT: 26.3 % (ref 11.6–15.1)
GFR SERPL CREATININE-BSD FRML MDRD: 106 ML/MIN/1.73SQ M
GLUCOSE SERPL-MCNC: 92 MG/DL (ref 65–140)
HCT VFR BLD AUTO: 30.4 % (ref 34.8–46.1)
HGB BLD-MCNC: 8.4 G/DL (ref 11.5–15.4)
HGB BLD-MCNC: 8.7 G/DL (ref 11.5–15.4)
HGB BLD-MCNC: 8.8 G/DL (ref 11.5–15.4)
IMM GRANULOCYTES # BLD AUTO: 0.03 THOUSAND/UL (ref 0–0.2)
IMM GRANULOCYTES NFR BLD AUTO: 1 % (ref 0–2)
LYMPHOCYTES # BLD AUTO: 1.74 THOUSANDS/ÂΜL (ref 0.6–4.47)
LYMPHOCYTES NFR BLD AUTO: 34 % (ref 14–44)
MCH RBC QN AUTO: 16.4 PG (ref 26.8–34.3)
MCHC RBC AUTO-ENTMCNC: 27.6 G/DL (ref 31.4–37.4)
MCV RBC AUTO: 59 FL (ref 82–98)
MONOCYTES # BLD AUTO: 0.54 THOUSAND/ÂΜL (ref 0.17–1.22)
MONOCYTES NFR BLD AUTO: 11 % (ref 4–12)
NEUTROPHILS # BLD AUTO: 2.56 THOUSANDS/ÂΜL (ref 1.85–7.62)
NEUTS SEG NFR BLD AUTO: 50 % (ref 43–75)
NRBC BLD AUTO-RTO: 0 /100 WBCS
PLATELET # BLD AUTO: 424 THOUSANDS/UL (ref 149–390)
PMV BLD AUTO: 9.2 FL (ref 8.9–12.7)
POTASSIUM SERPL-SCNC: 3.2 MMOL/L (ref 3.5–5.3)
RBC # BLD AUTO: 5.13 MILLION/UL (ref 3.81–5.12)
SODIUM SERPL-SCNC: 137 MMOL/L (ref 135–147)
WBC # BLD AUTO: 5.06 THOUSAND/UL (ref 4.31–10.16)

## 2024-01-25 PROCEDURE — 45378 DIAGNOSTIC COLONOSCOPY: CPT | Performed by: INTERNAL MEDICINE

## 2024-01-25 PROCEDURE — 80048 BASIC METABOLIC PNL TOTAL CA: CPT | Performed by: INTERNAL MEDICINE

## 2024-01-25 PROCEDURE — 85025 COMPLETE CBC W/AUTO DIFF WBC: CPT | Performed by: INTERNAL MEDICINE

## 2024-01-25 PROCEDURE — 99239 HOSP IP/OBS DSCHRG MGMT >30: CPT | Performed by: INTERNAL MEDICINE

## 2024-01-25 PROCEDURE — 85018 HEMOGLOBIN: CPT | Performed by: INTERNAL MEDICINE

## 2024-01-25 PROCEDURE — 43239 EGD BIOPSY SINGLE/MULTIPLE: CPT | Performed by: INTERNAL MEDICINE

## 2024-01-25 PROCEDURE — 0DB98ZX EXCISION OF DUODENUM, VIA NATURAL OR ARTIFICIAL OPENING ENDOSCOPIC, DIAGNOSTIC: ICD-10-PCS | Performed by: INTERNAL MEDICINE

## 2024-01-25 PROCEDURE — C9113 INJ PANTOPRAZOLE SODIUM, VIA: HCPCS | Performed by: INTERNAL MEDICINE

## 2024-01-25 PROCEDURE — 88342 IMHCHEM/IMCYTCHM 1ST ANTB: CPT | Performed by: PATHOLOGY

## 2024-01-25 PROCEDURE — 88305 TISSUE EXAM BY PATHOLOGIST: CPT | Performed by: PATHOLOGY

## 2024-01-25 PROCEDURE — 0DJD8ZZ INSPECTION OF LOWER INTESTINAL TRACT, VIA NATURAL OR ARTIFICIAL OPENING ENDOSCOPIC: ICD-10-PCS | Performed by: INTERNAL MEDICINE

## 2024-01-25 RX ORDER — POTASSIUM CHLORIDE 20 MEQ/1
40 TABLET, EXTENDED RELEASE ORAL ONCE
Status: COMPLETED | OUTPATIENT
Start: 2024-01-25 | End: 2024-01-25

## 2024-01-25 RX ORDER — SODIUM CHLORIDE 9 MG/ML
INJECTION, SOLUTION INTRAVENOUS CONTINUOUS PRN
Status: DISCONTINUED | OUTPATIENT
Start: 2024-01-25 | End: 2024-01-25

## 2024-01-25 RX ORDER — PANTOPRAZOLE SODIUM 40 MG/1
40 TABLET, DELAYED RELEASE ORAL DAILY
Qty: 30 TABLET | Refills: 0 | Status: SHIPPED | OUTPATIENT
Start: 2024-01-25 | End: 2024-02-24

## 2024-01-25 RX ORDER — LIDOCAINE HYDROCHLORIDE 10 MG/ML
INJECTION, SOLUTION EPIDURAL; INFILTRATION; INTRACAUDAL; PERINEURAL AS NEEDED
Status: DISCONTINUED | OUTPATIENT
Start: 2024-01-25 | End: 2024-01-25

## 2024-01-25 RX ORDER — FERROUS SULFATE 324(65)MG
324 TABLET, DELAYED RELEASE (ENTERIC COATED) ORAL
Qty: 30 TABLET | Refills: 0 | Status: SHIPPED | OUTPATIENT
Start: 2024-01-25 | End: 2024-02-24

## 2024-01-25 RX ORDER — PROPOFOL 10 MG/ML
INJECTION, EMULSION INTRAVENOUS AS NEEDED
Status: DISCONTINUED | OUTPATIENT
Start: 2024-01-25 | End: 2024-01-25

## 2024-01-25 RX ADMIN — PROPOFOL 50 MG: 10 INJECTION, EMULSION INTRAVENOUS at 11:10

## 2024-01-25 RX ADMIN — PROPOFOL 50 MG: 10 INJECTION, EMULSION INTRAVENOUS at 11:12

## 2024-01-25 RX ADMIN — LIDOCAINE HYDROCHLORIDE 50 MG: 10 INJECTION, SOLUTION EPIDURAL; INFILTRATION; INTRACAUDAL; PERINEURAL at 11:06

## 2024-01-25 RX ADMIN — POTASSIUM CHLORIDE 40 MEQ: 1500 TABLET, EXTENDED RELEASE ORAL at 12:22

## 2024-01-25 RX ADMIN — PANTOPRAZOLE SODIUM 40 MG: 40 INJECTION, POWDER, FOR SOLUTION INTRAVENOUS at 03:45

## 2024-01-25 RX ADMIN — IRON SUCROSE 200 MG: 20 INJECTION, SOLUTION INTRAVENOUS at 12:22

## 2024-01-25 RX ADMIN — PROPOFOL 40 MG: 10 INJECTION, EMULSION INTRAVENOUS at 11:15

## 2024-01-25 RX ADMIN — SODIUM CHLORIDE: 0.9 INJECTION, SOLUTION INTRAVENOUS at 10:36

## 2024-01-25 RX ADMIN — PANTOPRAZOLE SODIUM 40 MG: 40 INJECTION, POWDER, FOR SOLUTION INTRAVENOUS at 15:24

## 2024-01-25 RX ADMIN — PROPOFOL 100 MG: 10 INJECTION, EMULSION INTRAVENOUS at 11:06

## 2024-01-25 NOTE — ANESTHESIA PREPROCEDURE EVALUATION
Procedure:  COLONOSCOPY  EGD  NO recent URI, Non smoker  Relevant Problems   CARDIO   (+) Essential hypertension      HEMATOLOGY   (+) Anemia   Admitted with Hb-6.7 , transfused last Hb-8.8 this am   Hypokalemic -3.2  Physical Exam    Airway    Mallampati score: II  TM Distance: >3 FB  Neck ROM: full     Dental   No notable dental hx     Cardiovascular      Pulmonary      Other Findings  post-pubertal.      Anesthesia Plan  ASA Score- 2     Anesthesia Type- IV sedation with anesthesia with ASA Monitors.         Additional Monitors:     Airway Plan:     Comment: Consent Given using .       Plan Factors-Exercise tolerance (METS): >4 METS.    Chart reviewed. EKG reviewed.  Existing labs reviewed. Patient summary reviewed.    Patient is not a current smoker.              Induction- intravenous.    Postoperative Plan-     Informed Consent- Anesthetic plan and risks discussed with patient.  I personally reviewed this patient with the CRNA. Discussed and agreed on the Anesthesia Plan with the CRNA..

## 2024-01-25 NOTE — ANESTHESIA PROCEDURE NOTES
Anesthesia Notable Event    Date/Time: 1/25/2024 11:40 AM    Performed by: Tino Sorenson MD  Authorized by: Tino Sorenson MD

## 2024-01-25 NOTE — PLAN OF CARE
Problem: PAIN - ADULT  Goal: Verbalizes/displays adequate comfort level or baseline comfort level  Description: Interventions:  - Encourage patient to monitor pain and request assistance  - Assess pain using appropriate pain scale  - Administer analgesics based on type and severity of pain and evaluate response  - Implement non-pharmacological measures as appropriate and evaluate response  - Consider cultural and social influences on pain and pain management  - Notify physician/advanced practitioner if interventions unsuccessful or patient reports new pain  Outcome: Progressing     Problem: INFECTION - ADULT  Goal: Absence or prevention of progression during hospitalization  Description: INTERVENTIONS:  - Assess and monitor for signs and symptoms of infection  - Monitor lab/diagnostic results  - Monitor all insertion sites, i.e. indwelling lines, tubes, and drains  - Monitor endotracheal if appropriate and nasal secretions for changes in amount and color  - [place] appropriate cooling/warming therapies per order  - Administer medications as ordered  - Instruct and encourage patient and family to use good hand hygiene technique  - Identify and instruct in appropriate isolation precautions for identified infection/condition  Outcome: Progressing     Problem: SAFETY ADULT  Goal: Patient will remain free of falls  Description: INTERVENTIONS:  - Educate patient/family on patient safety including physical limitations  - Instruct patient to call for assistance with activity   - Consult OT/PT to assist with strengthening/mobility   - Keep Call bell within reach  - Keep bed low and locked with side rails adjusted as appropriate  - Keep care items and personal belongings within reach  - Initiate and maintain comfort rounds  - Make Fall Risk Sign visible to staff  - Obtain necessary fall risk management equipment: non-slip socks  - Apply yellow socks and bracelet for high fall risk patients  - Consider moving patient to  room near nurses station  Outcome: Progressing  Goal: Maintain or return to baseline ADL function  Description: INTERVENTIONS:  -  Assess patient's ability to carry out ADLs; assess patient's baseline for ADL function and identify physical deficits which impact ability to perform ADLs (bathing, care of mouth/teeth, toileting, grooming, dressing, etc.)  - Assess/evaluate cause of self-care deficits   - Assess range of motion  - Assess patient's mobility; develop plan if impaired  - Assess patient's need for assistive devices and provide as appropriate  - Encourage maximum independence but intervene and supervise when necessary  - Involve family in performance of ADLs  - Assess for home care needs following discharge   - Consider OT consult to assist with ADL evaluation and planning for discharge  - Provide patient education as appropriate  Outcome: Progressing  Goal: Maintains/Returns to pre admission functional level  Description: INTERVENTIONS:  - Perform AM-PAC 6 Click Basic Mobility/ Daily Activity assessment daily.  - Set and communicate daily mobility goal to care team and patient/family/caregiver.   - Collaborate with rehabilitation services on mobility goals if consulted  - Perform Range of Motion 3 times a day.  - Ambulate patient 3 times a day  - Out of bed to chair 3 times a day for meals  - Out of bed for toileting  - Record patient progress and toleration of activity level   Outcome: Progressing     Problem: DISCHARGE PLANNING  Goal: Discharge to home or other facility with appropriate resources  Description: INTERVENTIONS:  - Identify barriers to discharge w/patient and caregiver  - Arrange for needed discharge resources and transportation as appropriate  - Identify discharge learning needs (meds, wound care, etc.)  - Arrange for interpretive services to assist at discharge as needed  - Refer to Case Management Department for coordinating discharge planning if the patient needs post-hospital services  based on physician/advanced practitioner order or complex needs related to functional status, cognitive ability, or social support system  Outcome: Progressing     Problem: Knowledge Deficit  Goal: Patient/family/caregiver demonstrates understanding of disease process, treatment plan, medications, and discharge instructions  Description: Complete learning assessment and assess knowledge base.  Interventions:  - Provide teaching at level of understanding  - Provide teaching via preferred learning methods  Outcome: Progressing     Problem: GASTROINTESTINAL - ADULT  Goal: Maintains or returns to baseline bowel function  Description: INTERVENTIONS:  - Assess bowel function  - Encourage oral fluids to ensure adequate hydration  - Administer IV fluids if ordered to ensure adequate hydration  - Administer ordered medications as needed  - Encourage mobilization and activity  - Consider nutritional services referral to assist patient with adequate nutrition and appropriate food choices  Outcome: Progressing     Problem: HEMATOLOGIC - ADULT  Goal: Maintains hematologic stability  Description: INTERVENTIONS  - Assess for signs and symptoms of bleeding or hemorrhage  - Monitor labs  - Administer supportive blood products/factors as ordered and appropriate  Outcome: Progressing

## 2024-01-25 NOTE — TELEPHONE ENCOUNTER
Please set patient up for an office visit in 6-8 weeks with me for follow-up of iron deficiency anemia and ulcer. May need VCE.    Thanks,  Leny Garzon PA-C

## 2024-01-25 NOTE — ANESTHESIA POSTPROCEDURE EVALUATION
Post-Op Assessment Note    CV Status:  Stable  Pain Score: 0    Pain management: adequate       Mental Status:  Sleepy   Hydration Status:  Stable   PONV Controlled:  None   Airway Patency:  Patent     Post Op Vitals Reviewed: Yes    No anethesia notable event occurred.    Staff: Anesthesiologist, CRNA               BP   112/55   Temp      Pulse  69   Resp   14   SpO2   100

## 2024-01-25 NOTE — PERIOPERATIVE NURSING NOTE
Anthesthia consent, Procedure consent, and all questions for check in process obtained via [employer] , Daphney #[id number] used to communicate. Pt ready for procedure.

## 2024-01-25 NOTE — QUICK NOTE
GI Procedure note    More details and pictures of procedure in the procedure tab in chart review.    IMPRESSION:  Irregular Z-line  Mild erythematous and ulcerated mucosa in the antrum; performed cold forceps biopsy  The duodenum appeared normal.  The esophagus appeared normal.    IMPRESSION:  Small hemorrhoids  Normal.    No source of bleeding noted throughout the upper GI tract and colon.    RECOMMENDATION:   Await pathology results     Continue PPI once daily outpatient.     Repeat screening colonoscopy in 10 years      Advance diet as tolerated.  Consider outpatient PillCam endoscopy if patient continues to have have anemia.  Await biopsies of upper endoscopy for ruling out H. pylori.  Treated positive.  We will reach out.  Trend hemoglobin.  Transfuse as needed.       Selma Billy MD

## 2024-01-25 NOTE — NURSING NOTE
This worker reached out to the on call GI provider about Pt bowel movements not being clear and that the bowel prep hasn't been fully drank before midnight. Misael Simon the on call provider gave the okay to have pt to keep drinking the bowel prep. This message was received at 0353.  Pt is still NPO. Will let oncoming nursing know.

## 2024-01-26 ENCOUNTER — TRANSITIONAL CARE MANAGEMENT (OUTPATIENT)
Dept: INTERNAL MEDICINE CLINIC | Facility: CLINIC | Age: 39
End: 2024-01-26

## 2024-01-27 NOTE — DISCHARGE SUMMARY
Discharge Summary - Esme Carl 38 y.o. female MRN: [id number]    Unit/Bed#: -01 Encounter: [id number]    Admission Date:   Admission Orders (From admission, onward)       Ordered        01/23/24 1633  INPATIENT ADMISSION  Once                            Admitting Diagnosis: Syncope [R55]  Primary hypertension [I10]  Upper GI bleed [K92.2]  Symptomatic anemia [D64.9]        Procedures Performed:   Orders Placed This Encounter   Procedures    ED ECG Documentation Only       Summary of Hospital Course:     Esme Carl is a 38 y.o. female past medical history of HTN who presents with syncope.  Patient stated she had lost consciousness at work today for about 5 minutes, no presyncopal symptoms, no tongue biting, urinary incontinence. NO chest pain, palpitations, feels more tired than usual for the past few weeks. Patient stated she had dark stools for the past month, denies use of NsAIDS. On arrival to the ED, patient had hgb of 6.7, patient had 1 UpRBC transfused. Repeat hgb above 8. GI was consulted. She had EGD and colonoscopy. EGD shows irregular Z line, mild erythematous a and ulcerated antrum. Patient will be dcd with PPI. She ws provided referrals for [facility] PCP and OBGYN. She also had low iron levels. Had 2 iron infusions and will be dcd with iron supplement    Significant Findings, Care, Treatment and Services Provided:   Colonoscopy    Result Date: 1/25/2024  Impression: Small hemorrhoids Normal. No source of bleeding noted throughout the colon. RECOMMENDATION:  Repeat screening colonoscopy in 10 years  Advance diet as tolerated.  Consider outpatient PillCam endoscopy if patient continues to have have anemia.  Await biopsies of upper endoscopy for ruling out H. pylori.  Treated positive.  We will reach out.  Trend hemoglobin.  Transfuse as needed.  Selma Billy MD     EGD    Result Date: 1/25/2024  Impression: Irregular Z-line Mild erythematous and ulcerated  mucosa in the antrum; performed cold forceps biopsy The duodenum appeared normal. The esophagus appeared normal. RECOMMENDATION:  Await pathology results Continue PPI once daily outpatient.  Selma Billy MD     CT head without contrast    Result Date: 1/23/2024  Impression: No acute intracranial abnormality. Workstation performed: [id number]     CT abdomen pelvis with contrast    Result Date: 1/23/2024  Impression: Questionable ascending colonic wall thickening, which may be related to underdistention versus mild colitis in the appropriate clinical setting. Consider follow-up colonoscopy for further evaluation, given anemia Otherwise, no acute abnormality in the abdomen or pelvis. Workstation performed: [id number]     XR chest 2 views    Result Date: 1/23/2024  Impression: No acute cardiopulmonary disease. Workstation performed: [id number]       No Chest XR results available for this patient.      Complications: none    Discharge Diagnosis: Anemia    Medical Problems       Resolved Problems  Date Reviewed: 9/3/2020   None         Condition at Discharge: stable         Discharge instructions/Information to patient and family:   See after visit summary for information provided to patient and family.      Provisions for Follow-Up Care:  See after visit summary for information related to follow-up care and any pertinent home health orders.      PCP: Hussein Ruano DO    Disposition: Home    Planned Readmission: No      Discharge Statement   I spent 32 minutes discharging the patient. This time was spent on the day of discharge. I had direct contact with the patient on the day of discharge. Additional documentation is required if more than 30 minutes were spent on discharge.     Discharge Medications:  See after visit summary for reconciled discharge medications provided to patient and family.

## 2024-01-28 LAB
ABO GROUP BLD BPU: NORMAL
ABO GROUP BLD BPU: NORMAL
BPU ID: NORMAL
BPU ID: NORMAL
CROSSMATCH: NORMAL
CROSSMATCH: NORMAL
UNIT DISPENSE STATUS: NORMAL
UNIT DISPENSE STATUS: NORMAL
UNIT PRODUCT CODE: NORMAL
UNIT PRODUCT CODE: NORMAL
UNIT PRODUCT VOLUME: 350 ML
UNIT PRODUCT VOLUME: 350 ML
UNIT RH: NORMAL
UNIT RH: NORMAL

## 2024-01-30 DIAGNOSIS — A04.8 H. PYLORI INFECTION: Primary | ICD-10-CM

## 2024-01-30 PROCEDURE — 88305 TISSUE EXAM BY PATHOLOGIST: CPT | Performed by: PATHOLOGY

## 2024-01-30 PROCEDURE — 88342 IMHCHEM/IMCYTCHM 1ST ANTB: CPT | Performed by: PATHOLOGY

## 2024-01-30 RX ORDER — OMEPRAZOLE 40 MG/1
40 CAPSULE, DELAYED RELEASE ORAL EVERY 12 HOURS
Qty: 28 CAPSULE | Refills: 0 | Status: SHIPPED | OUTPATIENT
Start: 2024-01-30 | End: 2024-02-13

## 2024-01-30 RX ORDER — METRONIDAZOLE 500 MG/1
500 TABLET ORAL EVERY 12 HOURS SCHEDULED
Qty: 28 TABLET | Refills: 0 | Status: SHIPPED | OUTPATIENT
Start: 2024-01-30 | End: 2024-02-13

## 2024-01-30 RX ORDER — DOXYCYCLINE 100 MG/1
100 TABLET ORAL 2 TIMES DAILY
Qty: 28 TABLET | Refills: 0 | Status: SHIPPED | OUTPATIENT
Start: 2024-01-30 | End: 2024-02-13

## 2024-01-30 RX ORDER — BISMUTH SUBSALICYLATE 262 MG/1
524 TABLET, CHEWABLE ORAL
Qty: 112 TABLET | Refills: 0 | Status: SHIPPED | OUTPATIENT
Start: 2024-01-30 | End: 2024-02-02 | Stop reason: SDUPTHER

## 2024-02-02 ENCOUNTER — OFFICE VISIT (OUTPATIENT)
Dept: INTERNAL MEDICINE CLINIC | Facility: CLINIC | Age: 39
End: 2024-02-02

## 2024-02-02 VITALS
TEMPERATURE: 98.2 F | HEART RATE: 87 BPM | SYSTOLIC BLOOD PRESSURE: 132 MMHG | DIASTOLIC BLOOD PRESSURE: 88 MMHG | WEIGHT: 145 LBS | HEIGHT: 60 IN | BODY MASS INDEX: 28.47 KG/M2

## 2024-02-02 DIAGNOSIS — N92.0 MENORRHAGIA WITH REGULAR CYCLE: ICD-10-CM

## 2024-02-02 DIAGNOSIS — I10 ESSENTIAL HYPERTENSION: ICD-10-CM

## 2024-02-02 DIAGNOSIS — A04.8 H. PYLORI INFECTION: ICD-10-CM

## 2024-02-02 DIAGNOSIS — K27.9 H PYLORI ULCER: ICD-10-CM

## 2024-02-02 DIAGNOSIS — K25.4 GASTROINTESTINAL HEMORRHAGE ASSOCIATED WITH GASTRIC ULCER: Primary | ICD-10-CM

## 2024-02-02 DIAGNOSIS — B96.81 H PYLORI ULCER: ICD-10-CM

## 2024-02-02 PROCEDURE — 99495 TRANSJ CARE MGMT MOD F2F 14D: CPT | Performed by: FAMILY MEDICINE

## 2024-02-02 RX ORDER — BISMUTH SUBSALICYLATE 262 MG/1
524 TABLET, CHEWABLE ORAL
Qty: 112 TABLET | Refills: 0 | Status: SHIPPED | OUTPATIENT
Start: 2024-02-02 | End: 2024-02-16

## 2024-02-02 RX ORDER — AMLODIPINE BESYLATE 10 MG/1
10 TABLET ORAL DAILY
Qty: 30 TABLET | Refills: 5 | Status: SHIPPED | OUTPATIENT
Start: 2024-02-02

## 2024-02-02 NOTE — ASSESSMENT & PLAN NOTE
Pt has always had heavy menses , recent UGI bleed + H pylori , d/c hgb 8.6 she is tolerating FeSO4 324 mg po q day , f/u cbc 4 week f.u ferritin in a few mo , await Gyn eval

## 2024-02-02 NOTE — ASSESSMENT & PLAN NOTE
Se HPI pt had been having dark stools x 2 weeks fainted at work and brought to [facility] ED hgb 6.7 , she had transfusion PRBC , EGD irrge Z line , mild erythema , antral ulceration , + H pylori course of doxy , flagyl , PPI , bismuth in place , will need f/u w GI as well

## 2024-02-02 NOTE — ASSESSMENT & PLAN NOTE
Pt taking doxy 100 mg bid , flagyl 500 mg bid to complete 14 day course , she is taking omprazole 40 mg po bid , she never got the bismuth 262 mg script , I sent it in 2 chewed bid x 14 days .she is feeling better with time , very little upper abd pain f/u appt here 4 weeks and check cbc

## 2024-02-02 NOTE — PROGRESS NOTES
Name: Esme Carl      : 1985      MRN: [id number]  Encounter Provider: Ayla Bradley MD  Encounter Date: 2024   Encounter department: [facility]    Assessment & Plan     1. Gastrointestinal hemorrhage associated with gastric ulcer  Assessment & Plan:  Se HPI pt had been having dark stools x 2 weeks fainted at work and brought to [facility] ED hgb 6.7 , she had transfusion PRBC , EGD irrge Z line , mild erythema , antral ulceration , + H pylori course of doxy , flagyl , PPI , bismuth in place , will need f/u w GI as well     Orders:  -     CBC and differential; Future  -     Ambulatory Referral to Obstetrics / Gynecology; Future; Expected date: 2024    2. Essential hypertension  Assessment & Plan:  Pt had not been taking norvasc since pre hospital stay , med refilled f/u here BP and status update 4 weeks     Orders:  -     amLODIPine (NORVASC) 10 mg tablet; Take 1 tablet (10 mg total) by mouth daily  -     Ambulatory Referral to Obstetrics / Gynecology; Future; Expected date: 2024    3. H pylori ulcer  Assessment & Plan:  Pt taking doxy 100 mg bid , flagyl 500 mg bid to complete 14 day course , she is taking omprazole 40 mg po bid , she never got the bismuth 262 mg script , I sent it in 2 chewed bid x 14 days .she is feeling better with time , very little upper abd pain f/u appt here 4 weeks and check cbc     Orders:  -     CBC and differential; Future    4. H. pylori infection  -     bismuth subsalicylate (PEPTO BISMOL) 262 MG chewable tablet; Chew 2 tablets (524 mg total) 4 (four) times a day (before meals and at bedtime) for 14 days    5. Menorrhagia with regular cycle  -     Ambulatory Referral to Obstetrics / Gynecology; Future; Expected date: 2024           Subjective      HPI New pt here to establish care TCM , interpretor Cheryl , # [id number] present during entire exam admitted to [facility] - 24 after LOC at work , found to have  hgb 6.7 ,had been having dark stools x 2 weeks   She needs gyn ,menarche age 10 , menses 5-6 days very heavy x years , cycle length ~ 28 days     Review of Systems   Constitutional:  Negative for chills, fatigue and fever.   HENT:  Negative for ear pain and sore throat.    Eyes:  Negative for pain and visual disturbance.   Respiratory:  Negative for cough and shortness of breath.    Cardiovascular:  Negative for chest pain and palpitations.   Gastrointestinal:  Negative for abdominal pain, blood in stool and vomiting.        Dark stools leading up to admission    Genitourinary:  Positive for menstrual problem. Negative for dysuria, hematuria and vaginal discharge.        Heavy menses lifelong    Musculoskeletal:  Negative for arthralgias and back pain.   Skin:  Negative for color change and rash.   Neurological:  Negative for seizures and syncope.   All other systems reviewed and are negative.      Current Outpatient Medications on File Prior to Visit   Medication Sig    doxycycline (ADOXA) 100 MG tablet Take 1 tablet (100 mg total) by mouth 2 (two) times a day for 14 days    ferrous sulfate 324 (65 Fe) mg Take 1 tablet (324 mg total) by mouth daily before breakfast    metroNIDAZOLE (FLAGYL) 500 mg tablet Take 1 tablet (500 mg total) by mouth every 12 (twelve) hours for 14 days    omeprazole (PriLOSEC) 40 MG capsule Take 1 capsule (40 mg total) by mouth every 12 (twelve) hours for 14 days    pantoprazole (PROTONIX) 40 mg tablet Take 1 tablet (40 mg total) by mouth daily    [DISCONTINUED] amLODIPine (NORVASC) 10 mg tablet Take 1 tablet (10 mg total) by mouth daily (Patient not taking: Reported on 2/2/2024)    [DISCONTINUED] bismuth subsalicylate (PEPTO BISMOL) 262 MG chewable tablet Chew 2 tablets (524 mg total) 4 (four) times a day (before meals and at bedtime) for 14 days (Patient not taking: Reported on 2/2/2024)       Objective     /88 (BP Location: Left arm, Patient Position: Sitting, Cuff Size: Large)    Pulse 87   Temp 98.2 °F (36.8 °C) (Temporal)   Ht 5' (1.524 m)   Wt 65.8 kg (145 lb)   LMP 01/15/2024 (Approximate)   BMI 28.32 kg/m²     Physical Exam  Constitutional:       Comments: Skin with good color turgor , well hydrated ,no distress noted  well hydrated    Neck:      Thyroid: No thyroid mass, thyromegaly or thyroid tenderness.   Cardiovascular:      Rate and Rhythm: Normal rate and regular rhythm.      Heart sounds: Normal heart sounds.   Pulmonary:      Effort: No tachypnea.      Breath sounds: Normal breath sounds.   Abdominal:      General: Bowel sounds are normal.      Tenderness: There is no abdominal tenderness. There is no right CVA tenderness, left CVA tenderness, guarding or rebound.   Lymphadenopathy:      Cervical:      Right cervical: No superficial or posterior cervical adenopathy.     Left cervical: No superficial or posterior cervical adenopathy.   Skin:     General: Skin is warm and dry.   Neurological:      Comments: Non focal exam        Ayla Bradley MD

## 2024-02-02 NOTE — ASSESSMENT & PLAN NOTE
Pt had not been taking norvasc since pre hospital stay , med refilled f/u here BP and status update 4 weeks